# Patient Record
Sex: MALE | Race: WHITE | NOT HISPANIC OR LATINO | Employment: OTHER | URBAN - METROPOLITAN AREA
[De-identification: names, ages, dates, MRNs, and addresses within clinical notes are randomized per-mention and may not be internally consistent; named-entity substitution may affect disease eponyms.]

---

## 2017-03-16 ENCOUNTER — HOSPITAL ENCOUNTER (INPATIENT)
Facility: OTHER | Age: 57
LOS: 2 days | Discharge: HOME | DRG: 641 | End: 2017-03-19
Attending: EMERGENCY MEDICINE | Admitting: EMERGENCY MEDICINE
Payer: MEDICARE

## 2017-03-16 DIAGNOSIS — E87.6 HYPOKALEMIA: ICD-10-CM

## 2017-03-16 DIAGNOSIS — G47.33 OSA (OBSTRUCTIVE SLEEP APNEA): ICD-10-CM

## 2017-03-16 DIAGNOSIS — I25.10 CORONARY ARTERY DISEASE INVOLVING NATIVE CORONARY ARTERY OF NATIVE HEART WITHOUT ANGINA PECTORIS: ICD-10-CM

## 2017-03-16 DIAGNOSIS — R79.89 ELEVATED LFTS: ICD-10-CM

## 2017-03-16 DIAGNOSIS — I10 ESSENTIAL HYPERTENSION: ICD-10-CM

## 2017-03-16 DIAGNOSIS — F10.10 ALCOHOL ABUSE: ICD-10-CM

## 2017-03-16 DIAGNOSIS — E87.1 HYPONATREMIA: Primary | ICD-10-CM

## 2017-03-16 DIAGNOSIS — R07.89 ATYPICAL CHEST PAIN: ICD-10-CM

## 2017-03-16 DIAGNOSIS — F17.200 SMOKER: ICD-10-CM

## 2017-03-16 DIAGNOSIS — R60.0 EDEMA OF BOTH LEGS: ICD-10-CM

## 2017-03-16 DIAGNOSIS — E66.01 MORBID OBESITY, UNSPECIFIED OBESITY TYPE: ICD-10-CM

## 2017-03-16 DIAGNOSIS — Z82.49 FAMILY HISTORY OF VASCULAR DISEASE: ICD-10-CM

## 2017-03-16 DIAGNOSIS — R17 ELEVATED BILIRUBIN: ICD-10-CM

## 2017-03-16 LAB
ALBUMIN SERPL BCP-MCNC: 3.5 G/DL
ALP SERPL-CCNC: 201 U/L
ALT SERPL W/O P-5'-P-CCNC: 121 U/L
ANION GAP SERPL CALC-SCNC: 11 MMOL/L
AST SERPL-CCNC: 230 U/L
BASOPHILS # BLD AUTO: 0 K/UL
BASOPHILS NFR BLD: 0 %
BILIRUB SERPL-MCNC: 2.6 MG/DL
BUN SERPL-MCNC: 12 MG/DL
CALCIUM SERPL-MCNC: 8.2 MG/DL
CHLORIDE SERPL-SCNC: 76 MMOL/L
CO2 SERPL-SCNC: 31 MMOL/L
CREAT SERPL-MCNC: 1.2 MG/DL
CREAT UR-MCNC: 68.3 MG/DL
DIFFERENTIAL METHOD: ABNORMAL
EOSINOPHIL # BLD AUTO: 0 K/UL
EOSINOPHIL NFR BLD: 0.3 %
ERYTHROCYTE [DISTWIDTH] IN BLOOD BY AUTOMATED COUNT: 13.2 %
EST. GFR  (AFRICAN AMERICAN): >60 ML/MIN/1.73 M^2
EST. GFR  (NON AFRICAN AMERICAN): >60 ML/MIN/1.73 M^2
GLUCOSE SERPL-MCNC: 100 MG/DL
HCT VFR BLD AUTO: 36.8 %
HGB BLD-MCNC: 13.6 G/DL
LIPASE SERPL-CCNC: 39 U/L
LYMPHOCYTES # BLD AUTO: 0.9 K/UL
LYMPHOCYTES NFR BLD: 11.4 %
MAGNESIUM SERPL-MCNC: 2 MG/DL
MCH RBC QN AUTO: 32.9 PG
MCHC RBC AUTO-ENTMCNC: 37 %
MCV RBC AUTO: 89 FL
MONOCYTES # BLD AUTO: 0.7 K/UL
MONOCYTES NFR BLD: 9 %
NEUTROPHILS # BLD AUTO: 6.1 K/UL
NEUTROPHILS NFR BLD: 79 %
PLATELET # BLD AUTO: 162 K/UL
PMV BLD AUTO: 8.4 FL
POCT GLUCOSE: 82 MG/DL (ref 70–110)
POTASSIUM SERPL-SCNC: 2.8 MMOL/L
PROT SERPL-MCNC: 5.8 G/DL
RBC # BLD AUTO: 4.13 M/UL
SODIUM SERPL-SCNC: 118 MMOL/L
SODIUM UR-SCNC: <20 MMOL/L
WBC # BLD AUTO: 7.69 K/UL

## 2017-03-16 PROCEDURE — 36415 COLL VENOUS BLD VENIPUNCTURE: CPT

## 2017-03-16 PROCEDURE — 83690 ASSAY OF LIPASE: CPT

## 2017-03-16 PROCEDURE — 96365 THER/PROPH/DIAG IV INF INIT: CPT

## 2017-03-16 PROCEDURE — 80053 COMPREHEN METABOLIC PANEL: CPT

## 2017-03-16 PROCEDURE — 83930 ASSAY OF BLOOD OSMOLALITY: CPT

## 2017-03-16 PROCEDURE — 63600175 PHARM REV CODE 636 W HCPCS: Performed by: PHYSICIAN ASSISTANT

## 2017-03-16 PROCEDURE — 84300 ASSAY OF URINE SODIUM: CPT

## 2017-03-16 PROCEDURE — 82570 ASSAY OF URINE CREATININE: CPT

## 2017-03-16 PROCEDURE — 96361 HYDRATE IV INFUSION ADD-ON: CPT

## 2017-03-16 PROCEDURE — 83735 ASSAY OF MAGNESIUM: CPT

## 2017-03-16 PROCEDURE — 96366 THER/PROPH/DIAG IV INF ADDON: CPT

## 2017-03-16 PROCEDURE — 82962 GLUCOSE BLOOD TEST: CPT

## 2017-03-16 PROCEDURE — 85025 COMPLETE CBC W/AUTO DIFF WBC: CPT

## 2017-03-16 PROCEDURE — 99285 EMERGENCY DEPT VISIT HI MDM: CPT | Mod: 25

## 2017-03-16 PROCEDURE — 93005 ELECTROCARDIOGRAM TRACING: CPT

## 2017-03-16 PROCEDURE — 93010 ELECTROCARDIOGRAM REPORT: CPT | Mod: ,,, | Performed by: INTERNAL MEDICINE

## 2017-03-16 PROCEDURE — 25000003 PHARM REV CODE 250: Performed by: PHYSICIAN ASSISTANT

## 2017-03-16 RX ORDER — POTASSIUM CHLORIDE 7.45 MG/ML
10 INJECTION INTRAVENOUS
Status: COMPLETED | OUTPATIENT
Start: 2017-03-16 | End: 2017-03-17

## 2017-03-16 RX ORDER — POTASSIUM CHLORIDE 20 MEQ/1
20 TABLET, EXTENDED RELEASE ORAL
Status: DISCONTINUED | OUTPATIENT
Start: 2017-03-16 | End: 2017-03-16

## 2017-03-16 RX ORDER — POTASSIUM CHLORIDE 20 MEQ/15ML
20 SOLUTION ORAL
Status: DISCONTINUED | OUTPATIENT
Start: 2017-03-16 | End: 2017-03-16

## 2017-03-16 RX ORDER — DILTIAZEM HYDROCHLORIDE 180 MG/1
180 CAPSULE, EXTENDED RELEASE ORAL DAILY
COMMUNITY

## 2017-03-16 RX ORDER — CHLORDIAZEPOXIDE HYDROCHLORIDE 25 MG/1
50 CAPSULE, GELATIN COATED ORAL 4 TIMES DAILY
Status: DISPENSED | OUTPATIENT
Start: 2017-03-17 | End: 2017-03-17

## 2017-03-16 RX ORDER — POTASSIUM CHLORIDE 20 MEQ/15ML
40 SOLUTION ORAL
Status: COMPLETED | OUTPATIENT
Start: 2017-03-16 | End: 2017-03-16

## 2017-03-16 RX ADMIN — SODIUM CHLORIDE 1000 ML: 0.9 INJECTION, SOLUTION INTRAVENOUS at 10:03

## 2017-03-16 RX ADMIN — POTASSIUM CHLORIDE 40 MEQ: 40 SOLUTION ORAL at 11:03

## 2017-03-16 RX ADMIN — POTASSIUM CHLORIDE 10 MEQ: 10 INJECTION, SOLUTION INTRAVENOUS at 11:03

## 2017-03-16 NOTE — IP AVS SNAPSHOT
[facility] Location ([facility])  [address] [place], [place] [id number]  Phone: [phone]           Patient Discharge Instructions     Our goal is to set you up for success. This packet includes information on your condition, medications, and your home care. It will help you to care for yourself so you don't get sicker and need to go back to the hospital.     Please ask your nurse if you have any questions.        There are many details to remember when preparing to leave the hospital. Here is what you will need to do:    1. Take your medicine. If you are prescribed medications, review your Medication List in the following pages. You may have new medications to  at the pharmacy and others that you'll need to stop taking. Review the instructions for how and when to take your medications. Talk with your doctor or nurses if you are unsure of what to do.     2. Go to your follow-up appointments. Specific follow-up information is listed in the following pages. Your may be contacted by a transition nurse or clinical provider about future appointments. Be sure we have all of the phone numbers to reach you, if needed. Please contact your provider's office if you are unable to make an appointment.     3. Watch for warning signs. Your doctor or nurse will give you detailed warning signs to watch for and when to call for assistance. These instructions may also include educational information about your condition. If you experience any of warning signs to your health, call your doctor.               [facility]  Unless otherwise directed by your provider, please contact [facility], our nurse care line that is available for 24/7 assistance.     [phone] (toll-free)    Registered nurses in the [facility] provide clinical advisement, health education, appointment booking, and other advisory services.                    ** Verify the list of medication(s) below is accurate and up to  date. Carry this with you in case of emergency. If your medications have changed, please notify your healthcare provider.             Medication List      CONTINUE taking these medications        Additional Info                      ABILIFY 2 MG Tab   Refills:  2   Dose:  2 mg   Generic drug:  aripiprazole    Last time this was given:  2 mg on 3/19/2017 10:59 AM   Instructions:  Take 2 mg by mouth once daily.     Begin Date    AM    Noon    PM    Bedtime       atorvastatin 20 MG tablet   Commonly known as:  LIPITOR   Quantity:  90 tablet   Refills:  2   Comments:  Refill [id number]    Last time this was given:  20 mg on 3/19/2017 10:35 AM   Instructions:  Take 1 tablet by mouth every day     Begin Date    AM    Noon    PM    Bedtime       buPROPion 300 MG 24 hr tablet   Commonly known as:  WELLBUTRIN XL   Refills:  0   Dose:  300 mg    Last time this was given:  300 mg on 3/18/2017  8:50 AM   Instructions:  Take 300 mg by mouth once daily.     Begin Date    AM    Noon    PM    Bedtime       clopidogrel 75 mg tablet   Commonly known as:  PLAVIX   Quantity:  90 tablet   Refills:  3   Comments:  Refill [id number]    Last time this was given:  75 mg on 3/19/2017 10:35 AM   Instructions:  Take 1 tablet by mouth every day     Begin Date    AM    Noon    PM    Bedtime       diltiaZEM 180 MG Cpsr   Commonly known as:  TIAZAC   Refills:  0   Dose:  180 mg    Instructions:  Take 180 mg by mouth once daily.     Begin Date    AM    Noon    PM    Bedtime       ferrous sulfate 325 mg (65 mg iron) Tab tablet   Refills:  0   Dose:  325 mg    Instructions:  Take 325 mg by mouth daily with breakfast.     Begin Date    AM    Noon    PM    Bedtime       losartan 100 MG tablet   Commonly known as:  COZAAR   Quantity:  90 tablet   Refills:  2   Comments:  Refill [id number]    Last time this was given:  100 mg on 3/19/2017 10:35 AM   Instructions:  Take 1 tablet by mouth every day     Begin Date    AM    Noon    PM    Bedtime       ZOLOFT 100 MG  tablet   Refills:  0   Dose:  100 mg   Generic drug:  sertraline    Last time this was given:  200 mg on 3/19/2017  6:20 AM   Instructions:  Take 100 mg by mouth every morning.     Begin Date    AM    Noon    PM    Bedtime         STOP taking these medications     hydrochlorothiazide 25 MG tablet   Commonly known as:  HYDRODIURIL                  Please bring to all follow up appointments:    1. A copy of your discharge instructions.  2. All medicines you are currently taking in their original bottles.  3. Identification and insurance card.    Please arrive 15 minutes ahead of scheduled appointment time.    Please call 24 hours in advance if you must reschedule your appointment and/or time.        Your Scheduled Appointments     Apr 25, 2017  3:20 PM CDT   Established Patient Visit with Ming Sage MD   [facility]    [address] [place] [address]  [address]  [facility] [address] [phone]              Follow-up Information     Follow up with Ming Sage MD In 1 week.    Specialty:  Cardiology    Contact information:    [address] [place] [address] [phone]          Discharge Instructions     Future Orders    Activity as tolerated     Call MD for:  difficulty breathing or increased cough     Call MD for:  persistent nausea and vomiting or diarrhea     Call MD for:  redness, tenderness, or signs of infection (pain, swelling, redness, odor or green/yellow discharge around incision site)     Call MD for:  severe uncontrolled pain     Call MD for:  temperature >100.4     Diet general     Questions:    Total calories:      Fat restriction, if any:      Protein restriction, if any:      Na restriction, if any:      Fluid restriction:      Additional restrictions:          Discharge Instructions       Your feedback is important to us.  If you should receive a survey in the next few days, please share your experience with us.      Discharge  "References/Attachments     HYPONATREMIA (ENGLISH)    HYPONATREMIA, DISCHARGE INSTRUCTIONS (ENGLISH)        Primary Diagnosis     Your primary diagnosis was:  Low Sodium Levels      Admission Information     Date & Time Provider Department CSN    3/16/2017  9:09 PM Ming Sage MD [facility] [id number]      Care Providers     Provider Role Specialty Primary office phone    Ming Sage MD Attending Provider Cardiology [phone]      Your Vitals Were     BP Pulse Temp Resp Height Weight    109/65 (BP Location: Right arm, Patient Position: Sitting, BP Method: Automatic) 62 97.7 °F (36.5 °C) (Axillary) 18 5' 6" (1.676 m) 116.6 kg (257 lb)    SpO2 BMI             100% 41.48 kg/m2         Recent Lab Values     No lab values to display.      Allergies as of 3/19/2017     No Known Allergies      Advance Directives     An advance directive is a document which, in the event you are no longer able to make decisions for yourself, tells your healthcare team what kind of treatment you do or do not want to receive, or who you would like to make those decisions for you.  If you do not currently have an advance directive, [facility] encourages you to create one.  For more information call:  [phone]), [phone] ([phone]),  or log on to [email or web address].        Smoking Cessation     If you would like to quit smoking:   You may be eligible for free services if you are a [place] resident and started smoking cigarettes before September 1, 1988.  Call the Smoking Cessation Trust (SCT) toll free at (613) 601-4500 or (843) 442-7442.   Call 3-188-QUIT-NOW if you do not meet the above criteria.            Language Assistance Services     ATTENTION: Language assistance services are available, free of charge. Please call [phone].      ATENCIÓN: Si habla español, tiene a cummings disposición servicios gratuitos de asistencia lingüística. Llame al [phone].     CHÚ Ý: " N?u b?n nói Ti?ng Vi?t, có các d?ch v? h? tr? ngôn ng? mi?n phí dành cho b?n. G?i s? [phone].        [facility] Sign-Up     Activating your [facility] account is as easy as 1-2-3!     1) Visit [email or web address], select Sign Up Now, enter this activation code and your date of birth, then select Next.  [email or web address]  Expires: 5/3/2017 11:35 AM      2) Create a username and password to use when you visit [facility] in the future and select a security question in case you lose your password and select Next.    3) Enter your e-mail address and click Sign Up!    Additional Information  If you have questions, please e-mail [email or web address] or call [phone] to talk to our [facility] staff. Remember, [facility] is NOT to be used for urgent needs. For medical emergencies, dial 911.          [facility] complies with applicable Federal civil rights laws and does not discriminate on the basis of race, color, national origin, age, disability, or sex.

## 2017-03-17 PROBLEM — E87.1 HYPONATREMIA: Status: ACTIVE | Noted: 2017-03-17

## 2017-03-17 LAB
ANION GAP SERPL CALC-SCNC: 7 MMOL/L
BUN SERPL-MCNC: 9 MG/DL
CALCIUM SERPL-MCNC: 7.7 MG/DL
CHLORIDE SERPL-SCNC: 85 MMOL/L
CO2 SERPL-SCNC: 30 MMOL/L
CREAT SERPL-MCNC: 0.9 MG/DL
EST. GFR  (AFRICAN AMERICAN): >60 ML/MIN/1.73 M^2
EST. GFR  (NON AFRICAN AMERICAN): >60 ML/MIN/1.73 M^2
GLUCOSE SERPL-MCNC: 104 MG/DL
OSMOLALITY SERPL: 236 MOSM/KG
POTASSIUM SERPL-SCNC: 2.8 MMOL/L
SODIUM SERPL-SCNC: 122 MMOL/L

## 2017-03-17 PROCEDURE — 80048 BASIC METABOLIC PNL TOTAL CA: CPT

## 2017-03-17 PROCEDURE — 11000001 HC ACUTE MED/SURG PRIVATE ROOM

## 2017-03-17 PROCEDURE — 25000003 PHARM REV CODE 250: Performed by: PHYSICIAN ASSISTANT

## 2017-03-17 PROCEDURE — 25000003 PHARM REV CODE 250: Performed by: INTERNAL MEDICINE

## 2017-03-17 RX ORDER — POTASSIUM CHLORIDE 20 MEQ/15ML
40 SOLUTION ORAL ONCE
Status: COMPLETED | OUTPATIENT
Start: 2017-03-17 | End: 2017-03-17

## 2017-03-17 RX ORDER — SERTRALINE HYDROCHLORIDE 50 MG/1
200 TABLET, FILM COATED ORAL EVERY MORNING
Status: DISCONTINUED | OUTPATIENT
Start: 2017-03-17 | End: 2017-03-19 | Stop reason: HOSPADM

## 2017-03-17 RX ORDER — DILTIAZEM HYDROCHLORIDE 180 MG/1
180 CAPSULE, COATED, EXTENDED RELEASE ORAL DAILY
Status: DISCONTINUED | OUTPATIENT
Start: 2017-03-17 | End: 2017-03-19 | Stop reason: HOSPADM

## 2017-03-17 RX ORDER — SODIUM CHLORIDE AND POTASSIUM CHLORIDE 150; 900 MG/100ML; MG/100ML
INJECTION, SOLUTION INTRAVENOUS CONTINUOUS
Status: DISCONTINUED | OUTPATIENT
Start: 2017-03-17 | End: 2017-03-18

## 2017-03-17 RX ORDER — ACETAMINOPHEN 325 MG/1
650 TABLET ORAL EVERY 4 HOURS PRN
Status: DISCONTINUED | OUTPATIENT
Start: 2017-03-17 | End: 2017-03-19 | Stop reason: HOSPADM

## 2017-03-17 RX ORDER — ARIPIPRAZOLE 2 MG/1
2 TABLET ORAL DAILY
Status: DISCONTINUED | OUTPATIENT
Start: 2017-03-17 | End: 2017-03-19 | Stop reason: HOSPADM

## 2017-03-17 RX ORDER — CLOPIDOGREL BISULFATE 75 MG/1
75 TABLET ORAL DAILY
Status: DISCONTINUED | OUTPATIENT
Start: 2017-03-17 | End: 2017-03-19 | Stop reason: HOSPADM

## 2017-03-17 RX ORDER — BUPROPION HYDROCHLORIDE 150 MG/1
300 TABLET ORAL DAILY
Status: DISCONTINUED | OUTPATIENT
Start: 2017-03-17 | End: 2017-03-18

## 2017-03-17 RX ORDER — LOSARTAN POTASSIUM 50 MG/1
100 TABLET ORAL DAILY
Status: DISCONTINUED | OUTPATIENT
Start: 2017-03-17 | End: 2017-03-19 | Stop reason: HOSPADM

## 2017-03-17 RX ORDER — ONDANSETRON 2 MG/ML
4 INJECTION INTRAMUSCULAR; INTRAVENOUS EVERY 8 HOURS PRN
Status: DISCONTINUED | OUTPATIENT
Start: 2017-03-17 | End: 2017-03-19 | Stop reason: HOSPADM

## 2017-03-17 RX ORDER — ATORVASTATIN CALCIUM 20 MG/1
20 TABLET, FILM COATED ORAL DAILY
Status: DISCONTINUED | OUTPATIENT
Start: 2017-03-17 | End: 2017-03-19 | Stop reason: HOSPADM

## 2017-03-17 RX ADMIN — LOSARTAN POTASSIUM 100 MG: 50 TABLET, FILM COATED ORAL at 08:03

## 2017-03-17 RX ADMIN — ATORVASTATIN CALCIUM 20 MG: 20 TABLET, FILM COATED ORAL at 08:03

## 2017-03-17 RX ADMIN — BUPROPION HYDROCHLORIDE 300 MG: 150 TABLET, FILM COATED, EXTENDED RELEASE ORAL at 08:03

## 2017-03-17 RX ADMIN — POTASSIUM CHLORIDE 40 MEQ: 40 SOLUTION ORAL at 05:03

## 2017-03-17 RX ADMIN — CHLORDIAZEPOXIDE HYDROCHLORIDE 50 MG: 25 CAPSULE ORAL at 05:03

## 2017-03-17 RX ADMIN — CHLORDIAZEPOXIDE HYDROCHLORIDE 50 MG: 25 CAPSULE ORAL at 12:03

## 2017-03-17 RX ADMIN — SODIUM CHLORIDE AND POTASSIUM CHLORIDE: 9; 1.49 INJECTION, SOLUTION INTRAVENOUS at 05:03

## 2017-03-17 RX ADMIN — SODIUM CHLORIDE AND POTASSIUM CHLORIDE: 9; 1.49 INJECTION, SOLUTION INTRAVENOUS at 02:03

## 2017-03-17 RX ADMIN — CHLORDIAZEPOXIDE HYDROCHLORIDE 50 MG: 25 CAPSULE ORAL at 06:03

## 2017-03-17 RX ADMIN — CLOPIDOGREL BISULFATE 75 MG: 75 TABLET, FILM COATED ORAL at 08:03

## 2017-03-17 RX ADMIN — ARIPIPRAZOLE 2 MG: 2 TABLET ORAL at 08:03

## 2017-03-17 RX ADMIN — ACETAMINOPHEN 650 MG: 325 TABLET ORAL at 09:03

## 2017-03-17 RX ADMIN — SERTRALINE HYDROCHLORIDE 200 MG: 100 TABLET ORAL at 07:03

## 2017-03-17 NOTE — ED NOTES
Rounding on patient completed. Pt requesting juice.The call bell remains at the bedside for any additional patient needs. The patient is resting comfortably on the stretcher, respirations are even and unlabored, skin warm and dry. Will continue to monitor.

## 2017-03-17 NOTE — ED NOTES
Rounding on the patient has been done. he has been updated on the plan of care and his current status. Pain was assessed and is currently a 0/10. Comfort positioning and restroom needs were addressed. Necessary items were placed with in his reach and he was advised when a reassessment would take place. The call bell remains at the bedside for any additional patient needs. The patient is resting comfortably on the stretcher, respirations are even and unlabored, skin warm and dry. Will continue to monitor. Eating  breakfast.

## 2017-03-17 NOTE — ED NOTES
Rounding on the patient has been done. he has been updated on the plan of care and his current status. Pain was assessed and is currently a 0/10. Comfort positioning and restroom needs were addressed. Necessary items were placed with in his reach and he was advised when a reassessment would take place. The call bell remains at the bedside for any additional patient needs. The patient is resting comfortably on the stretcher, respirations are even and unlabored, skin warm and dry. Will continue to monitor. Pt informed diet order.

## 2017-03-17 NOTE — ED NOTES
Rounding on the patient has been done.The call bell remains at the bedside for any additional patient needs. The patient is resting comfortably on the stretcher, respirations are even and unlabored, skin warm and dry. Will continue to monitor.

## 2017-03-17 NOTE — ED NOTES
Report received from RADHA Esteban. Rounding on the patient has been done. he has been updated on the plan of care and his current status. The call bell remains at the bedside for any additional patient needs. The patient is resting comfortably on the stretcher, respirations are even and unlabored, skin warm and dry. Will continue to monitor.

## 2017-03-17 NOTE — ED NOTES
Rounding on the patient has been done. The call bell remains at the bedside for any additional patient needs. The patient is resting comfortably on the stretcher, respirations are even and unlabored, skin warm and dry. Will continue to monitor.

## 2017-03-17 NOTE — ED NOTES
Pt presents to the ED with c/o not feeling well for about a week, pt also reports he is an alcoholic. Pt reports he had low BP readings of home with readings of 88/41, 91/42. Pt reports he has had low appetite and not eating much over the past few days. Pt reports that he drinks all night and has been having episodes of N/V that have been getting worse over the past month. Denies diarrhea, fever. Pt reports he drinks 6 beers and 2 pints of scotch a night.

## 2017-03-17 NOTE — ED PROVIDER NOTES
Encounter Date: 3/16/2017       History     Chief Complaint   Patient presents with    Fatigue     after taking BP medication today. reports nausea, and decreased appetite all week.      Review of patient's allergies indicates:  No Known Allergies  HPI Comments: Patient is a 56 y.o. male with a past medical history of CAD, HTN, hypercholesterolemia, alcohol abuse, presenting to the emergency department with complaints of fatigue.  The patient reports that for the past week he has been feeling weak and increasingly fatigued.  He reports he's had decreased appetite and early satiety.  He states that earlier today while at his sister's house he became lightheaded and faint.  He states that his sister took his blood pressure with a wrist blood pressure cuff and noted it to be 88/41, and repeated a second time where was 91/42.  The patient reports he laid down, ate some chicken broth and felt some relief.  He denies chest pain or shortness of breath, but reports he is concerned with the amount of weakness he has been experiencing.  He reports he has been able tolerate fluids, but has not been eating well.  He does admit that he drinks 6 beers and 2 pints of scotch every night, and has done so for at least 6 months. He states he would like to quit drinking and is discussing this with his PCP.     The history is provided by the patient and the spouse.     Past Medical History:   Diagnosis Date    Coronary artery disease     Hypercholesteremia     Hypertension     Sleep apnea     Does not use CPAP     Past Surgical History:   Procedure Laterality Date    CHOLECYSTECTOMY      2005    CORONARY STENT PLACEMENT  2014    1 stent placed    GASTRIC BYPASS  2003     No family history on file.  Social History   Substance Use Topics    Smoking status: Current Every Day Smoker     Packs/day: 1.00     Years: 6.00     Types: Cigarettes     Start date: 7/20/2013    Smokeless tobacco: Not on file      Comment: 1 PPD    Alcohol  use Yes      Comment: Socially     Review of Systems   Constitutional: Positive for fatigue. Negative for activity change, chills and fever.   HENT: Negative for congestion, rhinorrhea, sinus pressure, sneezing, sore throat and trouble swallowing.    Eyes: Negative for photophobia and visual disturbance.   Respiratory: Negative for cough, shortness of breath and wheezing.    Cardiovascular: Negative for chest pain and palpitations.   Gastrointestinal: Positive for nausea. Negative for abdominal pain, constipation, diarrhea and vomiting.   Genitourinary: Negative for dysuria, hematuria and urgency.   Musculoskeletal: Negative for back pain, myalgias and neck pain.   Skin: Negative for color change and wound.   Neurological: Positive for weakness and light-headedness. Negative for dizziness, numbness and headaches.   Psychiatric/Behavioral: Negative for agitation and confusion.       Physical Exam   Initial Vitals   BP Pulse Resp Temp SpO2   03/16/17 1851 03/16/17 1851 03/16/17 1851 03/16/17 1851 03/16/17 1851   113/70 74 18 98.2 °F (36.8 °C) 97 %     Physical Exam    Nursing note and vitals reviewed.  Constitutional: Vital signs are normal. He appears well-developed and well-nourished. He is not diaphoretic.  Non-toxic appearance. He does not have a sickly appearance. No distress.   Well appearing,  male accompanied by his wife in the emergency department.  He is speaking in clear and full sentences, moving all extremities.  He is in no acute distress.   HENT:   Head: Normocephalic and atraumatic.   Right Ear: Hearing, tympanic membrane, external ear and ear canal normal.   Left Ear: Hearing, tympanic membrane, external ear and ear canal normal.   Nose: Nose normal.   Mouth/Throat: Oropharynx is clear and moist.   Eyes: Conjunctivae and EOM are normal.   Neck: Normal range of motion. Neck supple.   Cardiovascular: Normal rate, regular rhythm and normal heart sounds.   Pulmonary/Chest: Breath sounds normal.  No respiratory distress. He has no wheezes. He has no rhonchi. He has no rales.   Abdominal: Soft. Bowel sounds are normal. He exhibits no distension. There is tenderness. There is no rebound and no guarding.   Obese abdomen with tenderness to palpation of the right upper quadrant, no rebound, guarding.   Musculoskeletal: Normal range of motion.   Neurological: He is alert and oriented to person, place, and time. He has normal strength. No cranial nerve deficit or sensory deficit. GCS eye subscore is 4. GCS verbal subscore is 5. GCS motor subscore is 6.   Skin: Skin is warm.   Psychiatric: He has a normal mood and affect. His behavior is normal. Judgment and thought content normal.         ED Course   Procedures  Labs Reviewed   CBC W/ AUTO DIFFERENTIAL - Abnormal; Notable for the following:        Result Value    RBC 4.13 (*)     Hemoglobin 13.6 (*)     Hematocrit 36.8 (*)     MCH 32.9 (*)     MCHC 37.0 (*)     MPV 8.4 (*)     Lymph # 0.9 (*)     Gran% 79.0 (*)     Lymph% 11.4 (*)     All other components within normal limits   COMPREHENSIVE METABOLIC PANEL - Abnormal; Notable for the following:     Sodium 118 (*)     Potassium 2.8 (*)     Chloride 76 (*)     CO2 31 (*)     Calcium 8.2 (*)     Total Protein 5.8 (*)     Total Bilirubin 2.6 (*)     Alkaline Phosphatase 201 (*)      (*)      (*)     All other components within normal limits    Narrative:     Na critical result(s) called and verbal readback obtained from Eulalia Eldridge [id number], 03/16/2017 22:40   LIPASE   SODIUM, URINE, RANDOM   CREATININE, URINE, RANDOM   MAGNESIUM   MAGNESIUM   OSMOLALITY   OSMOLALITY   POCT GLUCOSE   POCT GLUCOSE MONITORING CONTINUOUS     EKG Readings: (Independently Interpreted)   Initial Reading: No STEMI. Previous EKG: Compared with most recent EKG Previous EKG Date: 9/19/16. Rhythm: Normal Sinus Rhythm. Heart Rate: 60.   Prolonged QT           Medical Decision Making:   History:   Old Medical Records: I  decided to obtain old medical records.  Old Records Summarized: other records.       <> Summary of Records: Reviewed medical records in epic  Independently Interpreted Test(s):   I have ordered and independently interpreted EKG Reading(s) - see prior notes  Clinical Tests:   Lab Tests: Ordered and Reviewed  The following lab test(s) were unremarkable: CBC, CMP and Lipase       <> Summary of Lab: Accu-Chek, magnesium, osmolality, urine sodium, urine creatinine  Radiological Study: Ordered and Reviewed  Medical Tests: Ordered and Reviewed  Other:   I have discussed this case with another health care provider.       <> Summary of the Discussion: Dr. Longo, Dr. Sage  This note was created using Dragon Medical Dictation. There may be typographical errors secondary to dictation.     Urgent evaluation of a 56 y.o. male with a past medical history of CAD, HTN, hypercholesterolemia, alcohol abuse, presenting to the emergency department complaining of fatigue. Patient is afebrile, nontoxic appearing and hemodynamically stable.  Physical exam reveals regular rate and rhythm, lungs are clear to auscultation bilaterally.  Mild tenderness to palpation of the abdomen in the right upper quadrant with no rebound, guarding, mass.  Will plan to obtain basic blood work, EKG, administer IV fluids and reassess.  EKG shows normal sinus rhythm with a rate of 60 bpm, no STEMI.  Prolonged QT is noted.  CBC shows no leukocytosis, H&H is 13.6/36.8.  CMP shows hyponatremia with a sodium of 118, hypokalemia with potassium of 2.8, chloride is 76, CO2 is 31, total bili is 2.6.  LFTs are all elevated, alkaline phosphatase is 201, AST is 230, AST is 121.  Lipase is normal.  Magnesium is normal.  Given these results, the patient does need to be admitted for electrolytes and further assessment. Patient sees Dr. Sage, will discuss possible admission with him.   11:19 PM Discussed with Dr. Sage, will admit the patient to him for further  management.   RUQ ultrasound ordered, potassium ordered, will continue IV hydration. The treatment plan was discussed with the patient who demonstrated understanding and comfort with plan. The patient's history, physical exam, and plan of care was discussed with and agreed upon with my supervising physician.     Past Medical History:   Diagnosis Date    Coronary artery disease     Hypercholesteremia     Hypertension     Sleep apnea     Does not use CPAP                     ED Course     Clinical Impression:     1. Hyponatremia    2. Hypokalemia    3. Elevated bilirubin    4. Elevated LFTs    5. Alcohol abuse         Disposition:   Disposition: Admitted  Condition: Stable       Padmini Gomez PA-C  03/17/17 0014

## 2017-03-18 PROBLEM — E87.6 HYPOKALEMIA: Status: ACTIVE | Noted: 2017-03-18

## 2017-03-18 LAB
ALBUMIN SERPL BCP-MCNC: 2.9 G/DL
ALP SERPL-CCNC: 143 U/L
ALT SERPL W/O P-5'-P-CCNC: 83 U/L
ANION GAP SERPL CALC-SCNC: 6 MMOL/L
AST SERPL-CCNC: 119 U/L
BILIRUB SERPL-MCNC: 1.4 MG/DL
BUN SERPL-MCNC: 6 MG/DL
CALCIUM SERPL-MCNC: 7.7 MG/DL
CHLORIDE SERPL-SCNC: 92 MMOL/L
CO2 SERPL-SCNC: 25 MMOL/L
CREAT SERPL-MCNC: 0.8 MG/DL
EST. GFR  (AFRICAN AMERICAN): >60 ML/MIN/1.73 M^2
EST. GFR  (NON AFRICAN AMERICAN): >60 ML/MIN/1.73 M^2
GLUCOSE SERPL-MCNC: 91 MG/DL
POTASSIUM SERPL-SCNC: 3.1 MMOL/L
PROT SERPL-MCNC: 5.1 G/DL
SODIUM SERPL-SCNC: 123 MMOL/L

## 2017-03-18 PROCEDURE — 80053 COMPREHEN METABOLIC PANEL: CPT

## 2017-03-18 PROCEDURE — 11000001 HC ACUTE MED/SURG PRIVATE ROOM

## 2017-03-18 PROCEDURE — 36415 COLL VENOUS BLD VENIPUNCTURE: CPT

## 2017-03-18 PROCEDURE — 25000003 PHARM REV CODE 250: Performed by: PHYSICIAN ASSISTANT

## 2017-03-18 PROCEDURE — 25000003 PHARM REV CODE 250: Performed by: INTERNAL MEDICINE

## 2017-03-18 RX ORDER — POTASSIUM CHLORIDE 20 MEQ/15ML
40 SOLUTION ORAL ONCE
Status: COMPLETED | OUTPATIENT
Start: 2017-03-18 | End: 2017-03-18

## 2017-03-18 RX ORDER — CHLORDIAZEPOXIDE HYDROCHLORIDE 10 MG/1
10 CAPSULE, GELATIN COATED ORAL 3 TIMES DAILY
Status: DISCONTINUED | OUTPATIENT
Start: 2017-03-18 | End: 2017-03-19 | Stop reason: HOSPADM

## 2017-03-18 RX ADMIN — SODIUM CHLORIDE AND POTASSIUM CHLORIDE: 9; 1.49 INJECTION, SOLUTION INTRAVENOUS at 10:03

## 2017-03-18 RX ADMIN — POTASSIUM CHLORIDE 40 MEQ: 40 SOLUTION ORAL at 12:03

## 2017-03-18 RX ADMIN — SERTRALINE HYDROCHLORIDE 200 MG: 100 TABLET ORAL at 05:03

## 2017-03-18 RX ADMIN — CHLORDIAZEPOXIDE HYDROCHLORIDE 10 MG: 10 CAPSULE ORAL at 12:03

## 2017-03-18 RX ADMIN — ATORVASTATIN CALCIUM 20 MG: 20 TABLET, FILM COATED ORAL at 08:03

## 2017-03-18 RX ADMIN — BUPROPION HYDROCHLORIDE 300 MG: 150 TABLET, FILM COATED, EXTENDED RELEASE ORAL at 08:03

## 2017-03-18 RX ADMIN — CLOPIDOGREL BISULFATE 75 MG: 75 TABLET, FILM COATED ORAL at 08:03

## 2017-03-18 RX ADMIN — ARIPIPRAZOLE 2 MG: 2 TABLET ORAL at 08:03

## 2017-03-18 RX ADMIN — DILTIAZEM HYDROCHLORIDE 180 MG: 180 CAPSULE, COATED, EXTENDED RELEASE ORAL at 08:03

## 2017-03-18 RX ADMIN — LOSARTAN POTASSIUM 100 MG: 50 TABLET, FILM COATED ORAL at 08:03

## 2017-03-18 NOTE — H&P
HISTORY OF PRESENT ILLNESS:  The patient is a 56-year-old gentleman that has   complained of profound weakness, nausea, lack of appetite, lightheadedness and   feeling faint for the last 3 or 4 days.  He says that his sister took his blood   pressure and noted to be low, he had some chicken broth and felt a little   better.  He decided to be checked out here for further evaluation because of his   concerned that he has become an alcoholic over the last year.  He says he now   drinks all night long, between 6 beers and 2 pints of scotch each night.  He   says he has quit smoking as of February of this year.  He has been afraid to   quit drinking on soon for fear of having withdrawals.    PAST MEDICAL HISTORY:  He had a longstanding history of hypertension, 20 years   ago, he used to use CPAP for sleep apnea; however, got a new CPAP machine about   two weeks ago, which he says he has not yet gotten accustomed to.  He has a   coronary artery disease, having had an intracoronary stent placed in his right   coronary artery, last year.  He used to be a heavy cigarette smoker; however,   has not smoked since the last month.  He has had previous gastric bypass surgery   in the year 2003.  He has a cholecystectomy in 2005.  He does not use any   illicit drugs.    FAMILY HISTORY:  His father has hypertension and degenerative joint disease of   his back.  His father is in his 80s.  His mother is a diabetic, has coronary   artery disease and is presently on dialysis, has end-stage renal disease.  She   is in her 80s.    REVIEW OF SYSTEMS:  CONSTITUTIONAL:  He has had a fair amount of weight gain over the last few   years.  He has had no fever or chills.  HEAD, EAR, NOSE AND THROAT:  He has no headaches.  He wears glasses.  No visual   change.  No nasal congestion.  No sore throat.  RESPIRATORY:  He has an occasional cough, no shortness of breath, no wheezing.  CARDIAC:  No chest pain, no palpitation, no shortness of breath.  No  pedal   edema.  GASTROINTESTINAL:  He has been nauseous, has no abdominal pain, constipation,   diarrhea or bleeding per rectum.  GENITOURINARY:  No hematuria, dysuria or pyuria.  SKIN:  No rash and no bruising.    PHYSICAL EXAMINATION:  GENERAL:  Reveals an alert, pleasant man in no acute distress.  VITAL SIGNS:  Blood pressure of 113/70 and a pulse of 74 beats per minute and   respiration of 80s per minute.  He is afebrile.  EYES:  The sclerae are nonicteric.  The conjunctivae are pink.  ENT:  Unremarkable.  NECK:  Supple.  There is no jugular venous distention.  The carotid upstroke is   brisk.  There is no carotid bruit.  CHEST:  Clear.  HEART:  Size is grossly normal.  S1 and S2 are normal.  There is no audible   murmur or gallop.  ABDOMEN:  Soft and nontender.  Bowel sounds are normal.  EXTREMITIES:  There is hyperpigmentation of both lower legs.  There is no pedal   edema.  NEUROLOGIC:  Grossly, the neurological exam is intact.    IMPRESSION ON ADMISSION:  1.  Profound hyponatremia, possibly from the daily use of hydrochlorothiazide.  2.  Hypokalemia, possibly due to hydrochlorothiazide.  3.  Obstructive sleep apnea.  4.  Morbid obesity.  5.  Stable coronary artery disease.  6.  Hypertension.  7.  Alcoholism.  8.  Ex-cigarette smoker.      NEERU/  dd: 03/17/2017 15:22:20 (CDT)  td: 03/17/2017 21:09:30 (CDT)  Doc ID   #[id number]  Job ID #[id number]    CC:

## 2017-03-18 NOTE — PROGRESS NOTES
Cardiology  Progress Notes    Subjective: Feels better. Can now eat!      Review of Systems:  Constitutional: no fever or chills  Eyes: no visual changes  ENT: no nasal congestion or sore throat  Respiratory: no cough or shortness of breath  Cardiovascular: no chest pain or palpitations  Gastrointestinal:  no nausea or vomiting  Genitourinary:  no hematuria or dysuria  Integument/Breast: no rash or pruritis  Hematologic/Lymphatic: no easy bruising or lymphadenopathy  Musculoskeletal:  no arthralgias or myalgias or deformities  Neurological: no seizures or tremors  Behavioral/Psych: no auditory or visual hallucinations  Endocrine: no heat or cold intolerance    Vital Signs:  Vitals:    03/18/17 0700 03/18/17 0746 03/18/17 0800 03/18/17 1000   BP:  99/61     BP Location:  Left arm     Patient Position:  Sitting     BP Method:  Automatic     Pulse: 68 61 83 66   Resp:  18     Temp:  97.8 °F (36.6 °C)     TempSrc:  Oral     SpO2:  99%     Weight:       Height:           Physical Exam:  General: no distress  Neck: no jugular venous distention. Carotid upstroke brisk. No carotid bruit. No thyromegaly, no lymphadenopathy  Lungs:  clear to auscultation bilaterally, normal respiratory effort and normal percussion bilaterally  Chest Wall: no tenderness, clear  Heart: regular rate and rhythm and no murmur, S1,S2 normal, no gallop  Abdomen: soft, non-tender non-distended; bowel sounds normal  Extremities: warm, no cyanosis or edema, or clubbing  Neurologic: alert, oriented, thought content appropriate    Problem List:  Active Hospital Problems    Diagnosis  POA    *Hyponatremia [E87.1]  Yes      Resolved Hospital Problems    Diagnosis Date Resolved POA   No resolved problems to display.         Laboratory:  CBC:   Recent Labs  Lab 03/16/17 2201   WBC 7.69   RBC 4.13*   HGB 13.6*   HCT 36.8*      MCV 89   MCH 32.9*   MCHC 37.0*     BMP:   Recent Labs  Lab 03/16/17 2201 03/18/17  0420     < > 91   *  < >  123*   K 2.8*  < > 3.1*   CL 76*  < > 92*   CO2 31*  < > 25   BUN 12  < > 6   CREATININE 1.2  < > 0.8   CALCIUM 8.2*  < > 7.7*   MG 2.0  --   --    < > = values in this interval not displayed.  CMP:   Recent Labs  Lab 03/18/17  0420   GLU 91   CALCIUM 7.7*   ALBUMIN 2.9*   PROT 5.1*   *   K 3.1*   CO2 25   CL 92*   BUN 6   CREATININE 0.8   ALKPHOS 143*   ALT 83*   *   BILITOT 1.4*     LFTs:   Recent Labs  Lab 03/18/17  0420   ALT 83*   *   ALKPHOS 143*   BILITOT 1.4*   PROT 5.1*   ALBUMIN 2.9*     Coagulation: No results for input(s): INR, APTT in the last 168 hours.    Invalid input(s): PT  Cardiac markers: No results for input(s): CKMB, CPKMB, TROPONINT, TROPONINI, MYOGLOBIN in the last 168 hours.    Imaging Results         X-Ray Chest PA And Lateral (Final result) Result time:  03/17/17 10:25:08    Final result by Tuyet Steward MD (03/17/17 10:25:08)    Impression:      Clear lungs       Electronically signed by: TUYET STEWARD  Date:     03/17/17  Time:    10:25     Narrative:    HISTORY:  smoker    TECHNIQUE: PA and lateral chest radiograph     COMPARISON: N/A      FINDINGS:  Support devices: None    Chest: Cardiac and mediastinal silhouettes are normal.  Lungs are well aerated and clear.  No pleural effusion or pneumothorax.    Upper Abdomen: Upper abdominal surgical clips.    Other: Remote left 6th rib fracture.            US Abdomen Limited (Final result) Result time:  03/17/17 00:41:29    Final result by El Pavon MD (03/17/17 00:41:29)    Impression:       Status post cholecystectomy with enlarged CBD.  MRCP or ERCP may be obtained for further evaluation.    Hepatic steatosis.    9 mm cyst in the upper pole of the right kidney.  No evidence of nephrolithiasis.              Electronically signed by: EL PAVON MD  Date:     03/17/17  Time:    00:41     Narrative:    Exam: [id number]  03/16/17  23:39:47 [id number] ([facility]) : US ABDOMEN LIMITED    Technique:    Limited transabdominal right upper  quadrant ultrasound was performed.    Comparison:     None     Findings:      The pancreas is not visualized secondary to extensive bowel gas.  The abdominal aorta and the IVC are  also not visualized secondary to overlying bowel gas.    The patient is status post cholecystectomy.  No abnormalities are identified in the gallbladder fossa.    The CBD measures 11.6 mm.  There is no evidence of intrahepatic biliary dilatation.  No stones are identified within the CBD.    The liver measures 21 cm.  The hepatic parenchyma is heterogeneous.  There is fatty infiltration of the liver.  No discrete masses  are identified.    The right kidney measures 10.8 x 5.1 x 5.3 cm.  There is a small cyst in the upper pole of the right kidney measuring 9 mm.  There is no evidence of nephrolithiasis or hydroureteronephrosis.    There is no evidence of free fluid.            Na 123  K3.1    Plan:   Fluid restriction.  Rx K+  F/U BMP in am.                      All above Labs and Tests have been reviewed.    Ming Sage

## 2017-03-18 NOTE — NURSING
Informed Dr. Sage via telephone of patients complaints of lower lumbar pain on scale of 5/10 with request for tylenol. New orders noted

## 2017-03-18 NOTE — PLAN OF CARE
Pt remains free from injury or falls. Vital signs stable throughout shift on room air. Bed in low locked position and call light within reach.  Will continue to monitor.

## 2017-03-19 VITALS
OXYGEN SATURATION: 100 % | DIASTOLIC BLOOD PRESSURE: 65 MMHG | WEIGHT: 257 LBS | SYSTOLIC BLOOD PRESSURE: 109 MMHG | BODY MASS INDEX: 41.3 KG/M2 | HEIGHT: 66 IN | RESPIRATION RATE: 18 BRPM | TEMPERATURE: 98 F | HEART RATE: 62 BPM

## 2017-03-19 LAB
ANION GAP SERPL CALC-SCNC: 7 MMOL/L
BUN SERPL-MCNC: 6 MG/DL
CALCIUM SERPL-MCNC: 8 MG/DL
CHLORIDE SERPL-SCNC: 94 MMOL/L
CO2 SERPL-SCNC: 27 MMOL/L
CREAT SERPL-MCNC: 0.8 MG/DL
EST. GFR  (AFRICAN AMERICAN): >60 ML/MIN/1.73 M^2
EST. GFR  (NON AFRICAN AMERICAN): >60 ML/MIN/1.73 M^2
GLUCOSE SERPL-MCNC: 81 MG/DL
POTASSIUM SERPL-SCNC: 4 MMOL/L
SODIUM SERPL-SCNC: 128 MMOL/L

## 2017-03-19 PROCEDURE — 36415 COLL VENOUS BLD VENIPUNCTURE: CPT

## 2017-03-19 PROCEDURE — 63600175 PHARM REV CODE 636 W HCPCS: Performed by: INTERNAL MEDICINE

## 2017-03-19 PROCEDURE — G0008 ADMIN INFLUENZA VIRUS VAC: HCPCS | Performed by: INTERNAL MEDICINE

## 2017-03-19 PROCEDURE — 25000003 PHARM REV CODE 250: Performed by: INTERNAL MEDICINE

## 2017-03-19 PROCEDURE — 3E0234Z INTRODUCTION OF SERUM, TOXOID AND VACCINE INTO MUSCLE, PERCUTANEOUS APPROACH: ICD-10-PCS | Performed by: INTERNAL MEDICINE

## 2017-03-19 PROCEDURE — 80048 BASIC METABOLIC PNL TOTAL CA: CPT

## 2017-03-19 PROCEDURE — 25000003 PHARM REV CODE 250: Performed by: PHYSICIAN ASSISTANT

## 2017-03-19 PROCEDURE — 90686 IIV4 VACC NO PRSV 0.5 ML IM: CPT | Performed by: INTERNAL MEDICINE

## 2017-03-19 PROCEDURE — 90471 IMMUNIZATION ADMIN: CPT | Performed by: INTERNAL MEDICINE

## 2017-03-19 RX ADMIN — INFLUENZA A VIRUS A/CALIFORNIA/7/2009 X-179A (H1N1) ANTIGEN (FORMALDEHYDE INACTIVATED), INFLUENZA A VIRUS A/HONG KONG/4801/2014 X-263B (H3N2) ANTIGEN (FORMALDEHYDE INACTIVATED), INFLUENZA B VIRUS B/PHUKET/3073/2013 ANTIGEN (FORMALDEHYDE INACTIVATED), AND INFLUENZA B VIRUS B/BRISBANE/60/2008 ANTIGEN (FORMALDEHYDE INACTIVATED) 0.5 ML: 15; 15; 15; 15 INJECTION, SUSPENSION INTRAMUSCULAR at 12:03

## 2017-03-19 RX ADMIN — ARIPIPRAZOLE 2 MG: 2 TABLET ORAL at 10:03

## 2017-03-19 RX ADMIN — ACETAMINOPHEN 650 MG: 325 TABLET ORAL at 10:03

## 2017-03-19 RX ADMIN — ATORVASTATIN CALCIUM 20 MG: 20 TABLET, FILM COATED ORAL at 10:03

## 2017-03-19 RX ADMIN — LOSARTAN POTASSIUM 100 MG: 50 TABLET, FILM COATED ORAL at 10:03

## 2017-03-19 RX ADMIN — SERTRALINE HYDROCHLORIDE 200 MG: 100 TABLET ORAL at 06:03

## 2017-03-19 RX ADMIN — DILTIAZEM HYDROCHLORIDE 180 MG: 180 CAPSULE, COATED, EXTENDED RELEASE ORAL at 10:03

## 2017-03-19 RX ADMIN — CLOPIDOGREL BISULFATE 75 MG: 75 TABLET, FILM COATED ORAL at 10:03

## 2017-03-20 NOTE — DISCHARGE SUMMARY
"HISTORY OF PRESENT ILLNESS AND HOSPITAL COURSE:  Mr. Jasso is a 56-year-old   gentleman who presented to the Emergency Room with the complaints of profound   weakness, nausea, lack of appetite, lightheadedness and feeling faint for the   last 3 or 4 days prior to this admission.  He says his sister took his blood   pressure, it was noted to be in the 80s, he had some chicken broth and felt   transiently a little better.  He recognizes that he has been drinking   excessively for the last year or so, drinks "all night long," he drinks 2 pints   of scotch and at least 6 beers all night.  He has quit smoking as of February of   this year, and two weeks ago, has started using his CPAP mask again, is not   very happy with this.  He has had obesity, had a gastric bypass surgery about 20   years ago, recently has been gaining weight; more so after he quit smoking and   after taking Abilify.  He has quit smoking as of February.  Last year, he had   angina, had a stent placed in his right coronary artery.  On examination, he was   noted to have normal vital signs, abdominal obesity, and otherwise the physical   examination was unremarkable.  The serum sodium level was 116.  The serum   potassium level was 2.8.  An ultrasound of the right upper quadrant of the   abdomen showed fatty liver.  No splenomegaly.    IMPRESSION ON ADMISSION:  1.  Profound hypokalemia, on daily hydrochlorothiazide.  2.  Hypokalemia, also due to hydrochlorothiazide.  3.  Morbid obesity.  4.  Obstructive sleep apnea.  5.  Stable coronary artery disease.  6.  Hypertension.  7.  Alcoholism.  8.  Ex-cigarette smoker.    Initially, he was started on IV normal saline with potassium, and he was also   given oral potassium.  The following morning, he said he felt much better, note   that he was also given oral Librium to prevent symptoms of alcohol withdrawal.    He was given another dose of oral potassium and continued on the IV fluids, the   following " "day the serum potassium level was 3.1, his serum sodium level was 123.    The IV fluids were discontinued, he was placed on fluid restriction to 800 mL   daily, was given a further dose of potassium.  The Librium was continued at 10   mg every 6 hours.  The following morning, he said he felt "great" for the first   time in several months.  His appetite is back, he has no further nausea or   weakness.  His serum sodium level is 128.  His potassium level is 4.0.  At the   time of discharge, he was advised to continue to withhold the   hydrochlorothiazide for the next couple of days to restrict his fluid intake.  I   will see him back in the office in 1 week, and at that time have repeat of his   BMP.  He is to check with his psychiatrist about whether he wants him to   continue the Abilify, he will also continue the Wellbutrin and Zoloft.  Note   that he has gained a fair amount of weight on Abilify.  He says that he is   determined to not drink again and not smoke again.  He does not want any further   prescriptions for Librium.  He feels he is over his fear of alcohol withdrawal.    He is being discharged in a stable and satisfactory condition, and he will   continue all his home medications with the exception of hydrochlorothiazide.      NEERU/  dd: 03/19/2017 11:50:49 (CDT)  td: 03/20/2017 00:03:46 (CDT)  Doc ID   #[id number]  Job ID #[id number]    CC:   "

## 2017-03-20 NOTE — PHYSICIAN QUERY
"PT Name: Leland Jasso  MR #: [id number]     Physician Query Form - Documentation Clarification      CDS/: Armida Orourke RN, CCDS              Contact information:[phone]     This form is a permanent document in the medical record.     Query Date: March 20, 2017    By submitting this query, we are merely seeking further clarification of documentation.  Please utilize your independent clinical judgment when addressing the question(s) below.     The Medical record contains the following:    Findings Supporting Clinical Information Location in Medical Record     "He says he now drinks all night long, between 6 beers and 2 pints of scotch each night.   "He says that he is determined to not drink again"                       "Profound hyponatremia, possibly from the daily use of hydrochlorothiazide.   Hypokalemia, possibly due to hydrochlorothiazide."        "Hypokalemia, also due to hydrochlorothiazide"       H&P 3/17/17      Discharge summary 3/19/17      H&P 3/17/17              Discharge Summary 3/19/17       Please clarify if ____hydrochlorothiazide._____ was:                                       (medication/drug)        [ x ] Correctly prescribed and properly administered    [  ] Incorrect drug/dose, improperly administered    [  ] Interaction of drug/medicine(s) and alcohol    [  ] Other: _________________________________    [  ] Clinically undetermined            Please document in your progress notes daily for the duration of treatment, until resolved, and include in your discharge summary.                                                                                "

## 2017-03-21 ENCOUNTER — PATIENT OUTREACH (OUTPATIENT)
Dept: ADMINISTRATIVE | Facility: CLINIC | Age: 57
End: 2017-03-21
Payer: MEDICARE

## 2017-03-21 NOTE — PATIENT INSTRUCTIONS
Discharge Instructions for Hyponatremia  You were diagnosed with hyponatremia, which means your blood level of sodium (salt) is too low. Salt is needed for the body and brain to work. Very low blood levels of sodium can be fatal. Symptoms can include headache, confusion, fatigue, muscle cramps, hallucinations, seizures, and coma. You have been treated to raise your blood levels of sodium. The following instructions will help you care for yourself at home as you have been instructed.  Home care  · Limit your intake of fluids. Drink only the amounts directed by your healthcare provider.  · Ask your healthcare provider what you should use to replace fluids if you are throwing up.  · Keep all follow-up appointments. Your provider needs to watch your condition closely.  To help prevent hyponatremia:  · Take all medicines exactly as directed. Certain medicines can lower blood sodium levels.  · If you have done something that makes you sweat a lot, drink fluids that contain salt and other electrolytes.   · Tell all healthcare providers what medicines you take. Mention all prescription and over-the-counter drugs and herbs.  · Have your sodium levels checked often. This is vital if you take a diuretic (medicine that helps your body get rid of water).  Follow-up  Schedule a follow-up visit as directed.  When to call your healthcare provider  Call your provider right away if you have any of the following:  · Severe tiredness  · Fainting  · Dizziness  · Loss of appetite  · Nausea or vomiting  · Confusion or forgetfullness  · Muscle spasms, cramping, or twitching  · Seizures  · Gait disturbances   Date Last Reviewed: 6/8/2015  © 1561-5703 [employer]. [address] [place] [address], [place] [address]. All rights reserved. This information is not intended as a substitute for professional medical care. Always follow your healthcare professional's instructions.

## 2017-03-24 ENCOUNTER — HOSPITAL ENCOUNTER (EMERGENCY)
Facility: OTHER | Age: 57
Discharge: HOME | End: 2017-03-24
Attending: EMERGENCY MEDICINE
Payer: MEDICARE

## 2017-03-24 VITALS
TEMPERATURE: 98 F | WEIGHT: 265 LBS | DIASTOLIC BLOOD PRESSURE: 63 MMHG | SYSTOLIC BLOOD PRESSURE: 120 MMHG | OXYGEN SATURATION: 98 % | BODY MASS INDEX: 42.59 KG/M2 | RESPIRATION RATE: 18 BRPM | HEART RATE: 58 BPM | HEIGHT: 66 IN

## 2017-03-24 DIAGNOSIS — R25.1 TREMOR OF BOTH HANDS: ICD-10-CM

## 2017-03-24 DIAGNOSIS — M79.89 LEG SWELLING: Primary | ICD-10-CM

## 2017-03-24 LAB
ALBUMIN SERPL BCP-MCNC: 3 G/DL
ALP SERPL-CCNC: 93 U/L
ALT SERPL W/O P-5'-P-CCNC: 51 U/L
ANION GAP SERPL CALC-SCNC: 5 MMOL/L
AST SERPL-CCNC: 48 U/L
BASOPHILS # BLD AUTO: 0 K/UL
BASOPHILS NFR BLD: 0 %
BILIRUB SERPL-MCNC: 0.3 MG/DL
BILIRUB UR QL STRIP: NEGATIVE
BNP SERPL-MCNC: 148 PG/ML
BUN SERPL-MCNC: 8 MG/DL
CALCIUM SERPL-MCNC: 8 MG/DL
CHLORIDE SERPL-SCNC: 105 MMOL/L
CLARITY UR: CLEAR
CO2 SERPL-SCNC: 24 MMOL/L
COLOR UR: YELLOW
CREAT SERPL-MCNC: 0.7 MG/DL
DIFFERENTIAL METHOD: ABNORMAL
EOSINOPHIL # BLD AUTO: 0 K/UL
EOSINOPHIL NFR BLD: 0.4 %
ERYTHROCYTE [DISTWIDTH] IN BLOOD BY AUTOMATED COUNT: 14.7 %
EST. GFR  (AFRICAN AMERICAN): >60 ML/MIN/1.73 M^2
EST. GFR  (NON AFRICAN AMERICAN): >60 ML/MIN/1.73 M^2
GLUCOSE SERPL-MCNC: 79 MG/DL
GLUCOSE UR QL STRIP: NEGATIVE
HCT VFR BLD AUTO: 32.8 %
HGB BLD-MCNC: 10.8 G/DL
HGB UR QL STRIP: NEGATIVE
KETONES UR QL STRIP: NEGATIVE
LEUKOCYTE ESTERASE UR QL STRIP: NEGATIVE
LIPASE SERPL-CCNC: 61 U/L
LYMPHOCYTES # BLD AUTO: 0.6 K/UL
LYMPHOCYTES NFR BLD: 13 %
MAGNESIUM SERPL-MCNC: 2.4 MG/DL
MCH RBC QN AUTO: 31.9 PG
MCHC RBC AUTO-ENTMCNC: 32.9 %
MCV RBC AUTO: 97 FL
MONOCYTES # BLD AUTO: 0.4 K/UL
MONOCYTES NFR BLD: 7.4 %
NEUTROPHILS # BLD AUTO: 3.8 K/UL
NEUTROPHILS NFR BLD: 79 %
NITRITE UR QL STRIP: NEGATIVE
PH UR STRIP: 8 [PH] (ref 5–8)
PHOSPHATE SERPL-MCNC: 2.7 MG/DL
PLATELET # BLD AUTO: 181 K/UL
PMV BLD AUTO: 8.7 FL
POTASSIUM SERPL-SCNC: 4.9 MMOL/L
PROT SERPL-MCNC: 5.7 G/DL
PROT UR QL STRIP: NEGATIVE
RBC # BLD AUTO: 3.39 M/UL
SODIUM SERPL-SCNC: 134 MMOL/L
SP GR UR STRIP: 1.01 (ref 1–1.03)
URN SPEC COLLECT METH UR: NORMAL
UROBILINOGEN UR STRIP-ACNC: NEGATIVE EU/DL
WBC # BLD AUTO: 4.85 K/UL

## 2017-03-24 PROCEDURE — 80053 COMPREHEN METABOLIC PANEL: CPT

## 2017-03-24 PROCEDURE — 83690 ASSAY OF LIPASE: CPT

## 2017-03-24 PROCEDURE — 83880 ASSAY OF NATRIURETIC PEPTIDE: CPT

## 2017-03-24 PROCEDURE — 84100 ASSAY OF PHOSPHORUS: CPT

## 2017-03-24 PROCEDURE — 81003 URINALYSIS AUTO W/O SCOPE: CPT

## 2017-03-24 PROCEDURE — 85025 COMPLETE CBC W/AUTO DIFF WBC: CPT

## 2017-03-24 PROCEDURE — 83735 ASSAY OF MAGNESIUM: CPT

## 2017-03-24 PROCEDURE — 99284 EMERGENCY DEPT VISIT MOD MDM: CPT

## 2017-03-24 NOTE — ED NOTES
Patient moved to ED room 8 via triage nurse, patient assisted onto stretcher and changed into a gown. Patient placed on cardiac monitor, continuous pulse oximetry and automatic blood pressure cuff. Bed placed in low locked position, side rails up x 2, call light is within reach of patient or family, orientation to room and explanation of wait provided to family and patient, alarms set and turned on for monitor and pulse ox, awaiting MD evaluation and orders, will continue to monitor.

## 2017-03-24 NOTE — ED PROVIDER NOTES
Encounter Date: 3/24/2017    SCRIBE #1 NOTE: I, Vera Pike, am scribing for, and in the presence of, Dr. Biggs.       History     Chief Complaint   Patient presents with    Leg Swelling     Pt seen here last week with low sodium and low potassium - states now bilateral pedal edema since yesterday and having difficulty with hand and arm shaking     Review of patient's allergies indicates:  No Known Allergies  HPI Comments: Time seen by provider: 12:44 PM    This is a 56 y.o. male who presents with complaint of bilateral leg swelling that began yesterday. He reports elevating his legs without relief. He denies previous leg swelling. He was admitted to his hospital last week for hyponitremia and hypokalemia. He also reports mild tremors in the bilateral hands, bilateral leg weakness, and dizziness that began five days ago, upon discharge. He reports difficulty grabbing objects, and he has trouble writing his name. He denies chest pain, shortness of breath, and headache.     The history is provided by the patient and the spouse.     Past Medical History:   Diagnosis Date    Coronary artery disease     Hypercholesteremia     Hypertension     Sleep apnea     Does not use CPAP     Past Surgical History:   Procedure Laterality Date    CHOLECYSTECTOMY      2005    CORONARY STENT PLACEMENT  2014    1 stent placed    GASTRIC BYPASS  2003     History reviewed. No pertinent family history.  Social History   Substance Use Topics    Smoking status: Current Every Day Smoker     Packs/day: 1.00     Years: 6.00     Types: Cigarettes     Start date: 7/20/2013    Smokeless tobacco: None      Comment: 1 PPD    Alcohol use Yes      Comment: Socially     Review of Systems   Constitutional: Negative for chills and fever.   HENT: Negative for congestion and facial swelling.    Respiratory: Negative for shortness of breath.    Cardiovascular: Positive for leg swelling. Negative for chest pain.   Gastrointestinal: Negative  for abdominal pain, nausea and vomiting.   Endocrine: Negative for polyuria.   Genitourinary: Negative for dysuria.   Musculoskeletal: Negative for myalgias.   Skin: Negative for rash.   Neurological: Positive for dizziness, tremors (bilateral hands) and weakness (bilateral legs). Negative for headaches.       Physical Exam   Initial Vitals   BP Pulse Resp Temp SpO2   03/24/17 1152 03/24/17 1152 03/24/17 1152 03/24/17 1152 03/24/17 1152   118/78 70 18 98.4 °F (36.9 °C) 98 %     Physical Exam    Nursing note and vitals reviewed.  Constitutional: He appears well-developed and well-nourished. He is not diaphoretic. No distress.   Obese.    HENT:   Head: Normocephalic and atraumatic.   Right Ear: External ear normal.   Left Ear: External ear normal.   Eyes: EOM are normal. Right eye exhibits no discharge. Left eye exhibits no discharge.   Neck: Normal range of motion.   Cardiovascular: Normal rate, regular rhythm and normal heart sounds. Exam reveals no gallop and no friction rub.    No murmur heard.  Pulmonary/Chest: Breath sounds normal. No respiratory distress. He has no wheezes. He has no rhonchi. He has no rales.   Abdominal: Soft. There is no tenderness. There is no rebound and no guarding.   Musculoskeletal: Normal range of motion. He exhibits edema. He exhibits no tenderness.   2-3+ lower extremity edema and venous stasis skin coloration changes.    Neurological: He is alert and oriented to person, place, and time.   Good  strength in the bilateral upper and lower extremities. No neurologic deficits. Mild essential tremor.    Skin: Skin is warm and dry. No rash noted. No pallor.   Psychiatric: He has a normal mood and affect. Thought content normal.         ED Course   Procedures  Labs Reviewed   CBC W/ AUTO DIFFERENTIAL - Abnormal; Notable for the following:        Result Value    RBC 3.39 (*)     Hemoglobin 10.8 (*)     Hematocrit 32.8 (*)     MCH 31.9 (*)     RDW 14.7 (*)     MPV 8.7 (*)     Lymph # 0.6  (*)     Gran% 79.0 (*)     Lymph% 13.0 (*)     All other components within normal limits   COMPREHENSIVE METABOLIC PANEL - Abnormal; Notable for the following:     Sodium 134 (*)     Calcium 8.0 (*)     Total Protein 5.7 (*)     Albumin 3.0 (*)     AST 48 (*)     ALT 51 (*)     Anion Gap 5 (*)     All other components within normal limits   LIPASE - Abnormal; Notable for the following:     Lipase 61 (*)     All other components within normal limits   B-TYPE NATRIURETIC PEPTIDE - Abnormal; Notable for the following:      (*)     All other components within normal limits   MAGNESIUM   PHOSPHORUS   URINALYSIS   MAGNESIUM   PHOSPHORUS             Medical Decision Making:   History:   Old Medical Records: I decided to obtain old medical records.  Old Records Summarized: records from previous admission(s).       <> Summary of Records: Patient was admitted on 03/16 for sodium of 118 and potassium of 2.8. Also mildly elevated LFTs. Discharged on 03/19 with sodium of 128 and potassium of 4.0. Right upper quadrant ultrasound showed hepatic steatosis and post cholecystectomy.   Clinical Tests:   Lab Tests: Ordered and Reviewed  ED Management:  56 y.o. male with recent admission for hyponitremia and hypokalemia. Symptoms are likely due to significant electrolyte shift in the last few weeks. His tremors are likely secondary to abstinence from alcohol since his recent admission. Will obtain blood work to evaluate for kidney failure or electrolyte abnormalities. No focal neurologic deficits to suggest intracranial abnormality. Do not believe CT is warranted.     3:05 PM: Patient's potassium is 4.9 and sodium is 134. Discussed the case with Dr. Sage, patient's cardiologist, who recommends stopping the diuretic and having the patient wear compression stockings. He will follow up in the clinic as an outpatient.               Scribe Attestation:   Scribe #1: I performed the above scribed service and the documentation  accurately describes the services I performed. I attest to the accuracy of the note.    Attending Attestation:           Physician Attestation for Scribe:  Physician Attestation Statement for Scribe #1: I, Dr. Biggs, reviewed documentation, as scribed by Vera Pike in my presence, and it is both accurate and complete.                 ED Course     Clinical Impression:     1. Leg swelling    2. Tremor of both hands             Kel Biggs, DO  03/24/17 [id number]

## 2017-03-24 NOTE — ED NOTES
Patient identifiers verified and correct for Leland HUONG Margaritavarinder.  Pt denies chest pain, SOB, blurred vision or dizziness currently.   LOC: The patient is awake, alert and aware of environment with an appropriate affect, the patient is oriented x 3 and speaking appropriately.   APPEARANCE: Patient resting comfortably and in no acute distress, patient is clean and well groomed, patient's clothing is properly fastened.  SKIN: The skin is warm and dry, color consistent with ethnicity, patient has normal skin turgor and moist mucus membranes, skin intact, no breakdown or bruising noted.   MUSCULOSKELETAL: Patient moving all extremities spontaneously, no obvious swelling or deformities noted.  RESPIRATORY: Airway is open and patent, respirations are spontaneous, patient has a normal effort and rate, no accessory muscle use noted, bilateral breath sounds clear.  CARDIAC: Patient has a normal rate and regular rhythm, +2 bilateral lower periphreal edema noted, capillary refill < 3 seconds.  ABDOMEN: Soft and non tender to palpation, no distention noted.  NEUROLOGIC: PERRL, 3 mm bilaterally, eyes open spontaneously, behavior appropriate to situation, follows commands, facial expression symmetrical, bilateral hand grasp equal and even, purposeful motor response noted, normal sensation in all extremities when touched with a finger, + intermittent numbness/tinlging to bilateral lower extremities.

## 2017-03-24 NOTE — ED AVS SNAPSHOT
[facility]  [address]  [facility] [address]               Leland Jasso   3/24/2017 11:55 AM   ED    Description:  Male : 1960   Department:  [facility]           Your Care was Coordinated By:     Provider Role From To    Kel Biggs DO Attending Provider 17 1207 --      Reason for Visit     Leg Swelling           Diagnoses this Visit        Comments    Leg swelling    -  Primary     Tremor of both hands           ED Disposition     None           To Do List           Follow-up Information     Follow up with Ming Sage MD In 1 week.    Specialty:  Cardiology    Contact information:    [address]  [facility] [address]  [phone]        [facility]     [facility] Care [facility] -  Assistance  Registered nurses in the [facility] provide clinical advisement, health education, appointment booking, and other advisory services.  Call for this free service at [phone].             Medications           Message regarding Medications     Verify the changes and/or additions to your medication regime listed below are the same as discussed with your clinician today.  If any of these changes or additions are incorrect, please notify your healthcare provider.             Verify that the below list of medications is an accurate representation of the medications you are currently taking.  If none reported, the list may be blank. If incorrect, please contact your healthcare provider. Carry this list with you in case of emergency.           Current Medications     ABILIFY 2 mg Tab Take 2 mg by mouth once daily.     atorvastatin (LIPITOR) 20 MG tablet Take 1 tablet by mouth every day    buPROPion (WELLBUTRIN XL) 300 MG 24 hr tablet Take 300 mg by mouth once daily.    clopidogrel (PLAVIX) 75 mg tablet Take 1 tablet by mouth every day    diltiaZEM (TIAZAC) 180 MG CpSR Take 180 mg by mouth once daily.  "   ferrous sulfate 325 mg (65 mg iron) Tab tablet Take 325 mg by mouth daily with breakfast.    losartan (COZAAR) 100 MG tablet Take 1 tablet by mouth every day    ZOLOFT 100 mg tablet Take 100 mg by mouth every morning.            Clinical Reference Information           Your Vitals Were     BP Pulse Temp Resp Height Weight    120/63 58 98.4 °F (36.9 °C) (Oral) 18 5' 6" (1.676 m) 120.2 kg (265 lb)    SpO2 BMI             100% 42.77 kg/m2         Allergies as of 3/24/2017     No Known Allergies      Immunizations Administered on Date of Encounter - 3/24/2017     None      ED Micro, Lab, POCT     Start Ordered       Status Ordering Provider    03/24/17 1251 03/24/17 1250  Magnesium  Add-on      Completed     03/24/17 1251 03/24/17 1250  Phosphorus  Add-on      Completed     03/24/17 1251 03/24/17 1250  Urinalysis  STAT      Final result     03/24/17 1217 03/24/17 1216  CBC auto differential  STAT      Final result     03/24/17 1217 03/24/17 1216  Comprehensive metabolic panel  STAT      Final result     03/24/17 1217 03/24/17 1216  Lipase  STAT      Final result     03/24/17 1217 03/24/17 1216  Brain natriuretic peptide  STAT      Final result     03/24/17 1216 03/24/17 1216  Magnesium  Once      Final result     03/24/17 1216 03/24/17 1216  Phosphorus  Once      Final result       ED Imaging Orders     None        Discharge Instructions       Keeps leg elevated at home. Use compression stockings. The tremors should slowly resolve.    Discharge References/Attachments     LEG SWELLING IN BOTH LEGS (ENGLISH)      Your Scheduled Appointments     Mar 29, 2017  1:00 PM CDT   Established Patient Visit with Ming Sage MD   [facility]    [address], [address]  [facility] [address]   [phone]            Apr 25, 2017  3:20 PM CDT   Established Patient Visit with Ming Sage MD   [facility]    [address], [address]  [place] [facility] [address]   [phone]              [facility] Sign-Up     Activating your [facility] account is as easy as 1-2-3!     1) Visit [email or web address], select Sign Up Now, enter this activation code and your date of birth, then select Next.  [email or web address]  Expires: 5/3/2017 11:35 AM      2) Create a username and password to use when you visit [facility] in the future and select a security question in case you lose your password and select Next.    3) Enter your e-mail address and click Sign Up!    Additional Information  If you have questions, please e-mail [email or web address] or call [phone] to talk to our [facility] staff. Remember, [facility] is NOT to be used for urgent needs. For medical emergencies, dial 911.         Smoking Cessation     If you would like to quit smoking:   You may be eligible for free services if you are a [place] resident and started smoking cigarettes before September 1, 1988.  Call the Smoking Cessation Trust ([facility]) toll free at (919) 352-1787 or (606) 503-8900.   Call 9-732-QUIT-NOW if you do not meet the above criteria.             [facility] complies with applicable Federal civil rights laws and does not discriminate on the basis of race, color, national origin, age, disability, or sex.        Language Assistance Services     ATTENTION: Language assistance services are available, free of charge. Please call [phone].      ATENCIÓN: Si habla español, tiene a cummings disposición servicios gratuitos de asistencia lingüística. Llame al [phone].     CHÚ Ý: N?u b?n nói Ti?ng Vi?t, có các d?ch v? h? tr? ngôn ng? mi?n phí dành cho b?n. G?i s? [phone].

## 2017-03-24 NOTE — ED TRIAGE NOTES
"Pt presents to ER w/ + bilateral lower extremity swelling w/ new onset yesterday. Pt states," I was just discharged on Sunday. I had a low sodium and potassium level". Pt reports + BLE intermittent numbness and tingling w/ a rated pain 4/10 on pain scale. Pt reports intermittent dizziness. Denies chest pain, SOB, blurred vision, N/V/D, chills or fever.  "

## 2017-03-24 NOTE — ED NOTES
Discharge papers reviewed with patient.  Patient verbalized understanding.  Patient directed to discharge window.  Paperwork given to registration.

## 2017-03-24 NOTE — ED NOTES
Patient resting comfortably in bed.  No signs of distress noted.  Patient has a visitor at the bedside.  Call light within reach.  Bed in the low and locked position.  Updated patient on his care.  Will continue to monitor.

## 2017-03-31 ENCOUNTER — HOSPITAL ENCOUNTER (OUTPATIENT)
Dept: RADIOLOGY | Facility: OTHER | Age: 57
Discharge: HOME | End: 2017-03-31
Attending: INTERNAL MEDICINE
Payer: MEDICARE

## 2017-03-31 DIAGNOSIS — R60.0 EDEMA OF BOTH LEGS: ICD-10-CM

## 2017-03-31 PROCEDURE — 74177 CT ABD & PELVIS W/CONTRAST: CPT | Mod: 26,,, | Performed by: RADIOLOGY

## 2017-03-31 PROCEDURE — 74177 CT ABD & PELVIS W/CONTRAST: CPT | Mod: TC

## 2017-03-31 PROCEDURE — 25500020 PHARM REV CODE 255: Performed by: INTERNAL MEDICINE

## 2017-03-31 RX ADMIN — IOHEXOL 100 ML: 350 INJECTION, SOLUTION INTRAVENOUS at 10:03

## 2017-03-31 RX ADMIN — IOHEXOL 25 ML: 350 INJECTION, SOLUTION INTRAVENOUS at 10:03

## 2017-05-23 PROBLEM — F10.20 ALCOHOLISM: Status: ACTIVE | Noted: 2017-05-23

## 2017-11-26 ENCOUNTER — HOSPITAL ENCOUNTER (EMERGENCY)
Facility: OTHER | Age: 57
Discharge: HOME | End: 2017-11-26
Attending: EMERGENCY MEDICINE
Payer: MEDICARE

## 2017-11-26 VITALS
DIASTOLIC BLOOD PRESSURE: 90 MMHG | RESPIRATION RATE: 28 BRPM | BODY MASS INDEX: 43.87 KG/M2 | SYSTOLIC BLOOD PRESSURE: 152 MMHG | HEIGHT: 66 IN | WEIGHT: 273 LBS | TEMPERATURE: 98 F | HEART RATE: 70 BPM | OXYGEN SATURATION: 98 %

## 2017-11-26 DIAGNOSIS — M79.661 TENDERNESS OF RIGHT CALF: ICD-10-CM

## 2017-11-26 DIAGNOSIS — R53.81 MALAISE AND FATIGUE: Primary | ICD-10-CM

## 2017-11-26 DIAGNOSIS — R53.83 MALAISE AND FATIGUE: Primary | ICD-10-CM

## 2017-11-26 DIAGNOSIS — E87.1 HYPONATREMIA: ICD-10-CM

## 2017-11-26 DIAGNOSIS — T50.2X5A ADVERSE EFFECT OF HYDROCHLOROTHIAZIDE, INITIAL ENCOUNTER: ICD-10-CM

## 2017-11-26 LAB
ALBUMIN SERPL BCP-MCNC: 3.3 G/DL
ALP SERPL-CCNC: 87 U/L
ALT SERPL W/O P-5'-P-CCNC: 16 U/L
ANION GAP SERPL CALC-SCNC: 10 MMOL/L
AST SERPL-CCNC: 20 U/L
BASOPHILS # BLD AUTO: 0 K/UL
BASOPHILS NFR BLD: 0 %
BILIRUB SERPL-MCNC: 0.3 MG/DL
BILIRUB UR QL STRIP: NEGATIVE
BNP SERPL-MCNC: 62 PG/ML
BUN SERPL-MCNC: 6 MG/DL
CALCIUM SERPL-MCNC: 9.1 MG/DL
CHLORIDE SERPL-SCNC: 94 MMOL/L
CLARITY UR: CLEAR
CO2 SERPL-SCNC: 25 MMOL/L
COLOR UR: YELLOW
CREAT SERPL-MCNC: 0.8 MG/DL
DIFFERENTIAL METHOD: ABNORMAL
EOSINOPHIL # BLD AUTO: 0.1 K/UL
EOSINOPHIL NFR BLD: 0.6 %
ERYTHROCYTE [DISTWIDTH] IN BLOOD BY AUTOMATED COUNT: 13.6 %
EST. GFR  (AFRICAN AMERICAN): >60 ML/MIN/1.73 M^2
EST. GFR  (NON AFRICAN AMERICAN): >60 ML/MIN/1.73 M^2
GLUCOSE SERPL-MCNC: 86 MG/DL
GLUCOSE UR QL STRIP: NEGATIVE
HCT VFR BLD AUTO: 30.1 %
HGB BLD-MCNC: 10.2 G/DL
HGB UR QL STRIP: NEGATIVE
KETONES UR QL STRIP: ABNORMAL
LEUKOCYTE ESTERASE UR QL STRIP: NEGATIVE
LYMPHOCYTES # BLD AUTO: 0.7 K/UL
LYMPHOCYTES NFR BLD: 9 %
MCH RBC QN AUTO: 31.7 PG
MCHC RBC AUTO-ENTMCNC: 33.9 G/DL
MCV RBC AUTO: 94 FL
MONOCYTES # BLD AUTO: 0.6 K/UL
MONOCYTES NFR BLD: 6.7 %
NEUTROPHILS # BLD AUTO: 6.8 K/UL
NEUTROPHILS NFR BLD: 83.3 %
NITRITE UR QL STRIP: NEGATIVE
PH UR STRIP: 7 [PH] (ref 5–8)
PLATELET # BLD AUTO: 364 K/UL
PMV BLD AUTO: 8.3 FL
POTASSIUM SERPL-SCNC: 4.3 MMOL/L
PROT SERPL-MCNC: 6.6 G/DL
PROT UR QL STRIP: NEGATIVE
RBC # BLD AUTO: 3.22 M/UL
SODIUM SERPL-SCNC: 129 MMOL/L
SP GR UR STRIP: 1.01 (ref 1–1.03)
TROPONIN I SERPL DL<=0.01 NG/ML-MCNC: <0.006 NG/ML
URN SPEC COLLECT METH UR: ABNORMAL
UROBILINOGEN UR STRIP-ACNC: NEGATIVE EU/DL
WBC # BLD AUTO: 8.18 K/UL

## 2017-11-26 PROCEDURE — 84484 ASSAY OF TROPONIN QUANT: CPT

## 2017-11-26 PROCEDURE — 83880 ASSAY OF NATRIURETIC PEPTIDE: CPT

## 2017-11-26 PROCEDURE — 99284 EMERGENCY DEPT VISIT MOD MDM: CPT | Mod: 25

## 2017-11-26 PROCEDURE — 93010 ELECTROCARDIOGRAM REPORT: CPT | Mod: ,,, | Performed by: INTERNAL MEDICINE

## 2017-11-26 PROCEDURE — 85025 COMPLETE CBC W/AUTO DIFF WBC: CPT

## 2017-11-26 PROCEDURE — 81003 URINALYSIS AUTO W/O SCOPE: CPT

## 2017-11-26 PROCEDURE — 93005 ELECTROCARDIOGRAM TRACING: CPT

## 2017-11-26 PROCEDURE — 80053 COMPREHEN METABOLIC PANEL: CPT

## 2017-11-26 PROCEDURE — 25000003 PHARM REV CODE 250: Performed by: EMERGENCY MEDICINE

## 2017-11-26 RX ORDER — OXYCODONE AND ACETAMINOPHEN 5; 325 MG/1; MG/1
2 TABLET ORAL
Status: COMPLETED | OUTPATIENT
Start: 2017-11-26 | End: 2017-11-26

## 2017-11-26 RX ADMIN — OXYCODONE HYDROCHLORIDE AND ACETAMINOPHEN 2 TABLET: 5; 325 TABLET ORAL at 07:11

## 2017-11-27 NOTE — ED NOTES
Pt requesting pain medicine for back pain. Pt reports he takes percocet 10 mg at home. MD notified.

## 2017-11-27 NOTE — ED PROVIDER NOTES
"Encounter Date: 11/26/2017    SCRIBE #1 NOTE: I, Brandi Turner, am scribing for, and in the presence of, Dr. Packer.       History     Chief Complaint   Patient presents with    Back Pain     w/ fatigue post lumbar fusion on 11/14/17. says pain medicine is not sufficient     Time seen by provider: 6:58 PM  This is a 57 y.o. male who presents with complaint of multiple complaints that began three hours ago. Pt reports general fatigue and malaise with associated nausea.  He states "I just think something's wrong and I want to get checked out."  He reports having a lumbar fusion surgery one week ago (11/14/17) and notes the incision site is healing well. He notes chronic constipation which he attributes to pain medications he's taking since surgery and sore throat which he attributes to breathing tube from surgery.  He decreased appetite, fatigue, rhinorrhea, sore throat, nausea, tremors, swelling and numbness to RLE, back pain, and light-headedness over the last few days.  He denies chest pain, SOB, chest tightness,  abdominal pain, vomiting, dysuria, hematuria, or numbness of tingling to genital area. Symptoms are described as constant.  He notes he has been compliant with pain medications and muscle relaxers, but experienced no relief today. He notes he stopped smoking one day prior to surgery, and has a pint of Scotch per night. He notes tremors occurs mainly at night, with or without alcohol.  He notes he has a cholecystectomy, gastric bypass, and a coronary stent placement.      The history is provided by the patient.     Review of patient's allergies indicates:  No Known Allergies  Past Medical History:   Diagnosis Date    Coronary artery disease     Hypercholesteremia     Hypertension     Sleep apnea     Does not use CPAP     Past Surgical History:   Procedure Laterality Date    CHOLECYSTECTOMY      2005    CORONARY STENT PLACEMENT  2014    1 stent placed    GASTRIC BYPASS  2003     History reviewed. No " pertinent family history.  Social History   Substance Use Topics    Smoking status: Current Every Day Smoker     Packs/day: 1.00     Years: 6.00     Types: Cigarettes     Start date: 7/20/2013    Smokeless tobacco: Never Used      Comment: 1 PPD    Alcohol use Yes      Comment: Socially     Review of Systems   Constitutional: Positive for appetite change and fatigue. Negative for chills, diaphoresis and fever.   HENT: Positive for rhinorrhea and sore throat. Negative for congestion and trouble swallowing.    Respiratory: Negative for cough, chest tightness and shortness of breath.    Cardiovascular: Positive for leg swelling. Negative for chest pain.   Gastrointestinal: Positive for constipation and nausea. Negative for abdominal pain, diarrhea and vomiting.   Genitourinary: Positive for difficulty urinating. Negative for discharge, dysuria, frequency, hematuria, penile pain, penile swelling, scrotal swelling and testicular pain.   Musculoskeletal: Positive for back pain. Negative for joint swelling, myalgias, neck pain and neck stiffness.   Skin: Negative for rash and wound.   Neurological: Positive for tremors, light-headedness and numbness. Negative for dizziness, syncope, weakness and headaches.       Physical Exam     Initial Vitals [11/26/17 1822]   BP Pulse Resp Temp SpO2   (!) 174/95 72 (!) 22 98.4 °F (36.9 °C) (!) 93 %      MAP       121.33         Physical Exam    Nursing note and vitals reviewed.  Constitutional: He appears well-developed and well-nourished. He is not diaphoretic. No distress.   HENT:   Head: Normocephalic and atraumatic.   Nose: Nose normal.   Mouth/Throat: Oropharynx is clear and moist.   Eyes: EOM are normal. Pupils are equal, round, and reactive to light.   Neck: Normal range of motion.   Cardiovascular: Normal rate, regular rhythm, normal heart sounds and intact distal pulses.   No murmur heard.  Pulmonary/Chest: Breath sounds normal. No respiratory distress. He has no rales.    Abdominal: Soft. Bowel sounds are normal. There is no tenderness.   Musculoskeletal: Normal range of motion. He exhibits edema. He exhibits no tenderness.   Mild, swelling to right calf with edema.   Neurological: He is alert and oriented to person, place, and time. He has normal strength. A sensory deficit (chronic, foot decreased sensation) is present. No cranial nerve deficit.   Skin: Skin is warm and dry.   Psychiatric: He has a normal mood and affect. His behavior is normal. Judgment and thought content normal.         ED Course   Procedures  Labs Reviewed   CBC W/ AUTO DIFFERENTIAL - Abnormal; Notable for the following:        Result Value    RBC 3.22 (*)     Hemoglobin 10.2 (*)     Hematocrit 30.1 (*)     MCH 31.7 (*)     Platelets 364 (*)     MPV 8.3 (*)     Lymph # 0.7 (*)     Gran% 83.3 (*)     Lymph% 9.0 (*)     All other components within normal limits   COMPREHENSIVE METABOLIC PANEL - Abnormal; Notable for the following:     Sodium 129 (*)     Chloride 94 (*)     Albumin 3.3 (*)     All other components within normal limits    Narrative:     Recoll. [id number] by OLR at 11/26/2017 19:46, reason: Specimen   hemolyzed spoke to Yanira Neil RN request lab recollect   URINALYSIS - Abnormal; Notable for the following:     Ketones, UA Trace (*)     All other components within normal limits   TROPONIN I    Narrative:     Recoll. [id number] by OLR at 11/26/2017 19:46, reason: Specimen   hemolyzed spoke to Yanira Neil RN request lab recollect   B-TYPE NATRIURETIC PEPTIDE     EKG Readings: (Independently Interpreted)   Initial Reading: No STEMI.   Normal Sinus Rhythm at a rate of 69 bpm.          Medical Decision Making:   Clinical Tests:   Lab Tests: Ordered and Reviewed  Radiological Study: Ordered and Reviewed  Medical Tests: Ordered and Reviewed  ED Management:  Emergent evaluation a 57-year-old male status post lumbar fusion surgery who presents with 1 day of malaise and fatigue, nausea.  He has other  "complaints as well which have been ongoing.  Physical exam is benign, and there is no evidence by history or exam of cauda equina syndrome or cord compression.  He is afebrile and I do not suspect a surgical infection or epidural abscess.  I considered possibility of "silent MI" but EKG is normal.  Patient's labs are significant for some mild hyponatremia.  He states he's had this problem in the past.  I could attribute his symptoms to this.  On his medication list he does take hydrochlorothiazide which is well known to cause hyponatremia.  I will discontinue this medication.  He was offered admission but I am comfortable with discharge home since symptoms and lab values are not extremely deranged.  Additionally, ultrasound of the leg was performed for swelling which shows no DVT.  He was discharged in good condition, advised to discontinue hydrochlorothiazide and to follow-up closely with his PCP or to return here for any new or worsening symptoms.    Imaging Results          US Lower Extremity Veins Right (Final result)  Result time 11/26/17 20:57:37    Final result by Snow Wong MD (11/26/17 20:57:37)                 Impression:      No evidence of right lower extremity DVT.        Electronically signed by: SNOW WONG MD  Date:     11/26/17  Time:    20:57              Narrative:    Reason for study: Rule out DVT     Comparison: None.    Technique: Routine right lower extremity venous ultrasound was performed using grayscale and color flow doppler spectral analysis.    Findings: No evidence of clot involving the bilateral common femoral veins or right greater saphenous, femoral, popliteal, peroneal, anterior and posterior tibial veins.  All venous structures demonstrate normal respiratory phasicity and augment adequately.  No evidence of soft tissue mass or Baker's cyst.                                      Scribe Attestation:   Scribe #1: I performed the above scribed service and the documentation " accurately describes the services I performed. I attest to the accuracy of the note.    Attending Attestation:           Physician Attestation for Scribe:  Physician Attestation Statement for Scribe #1: I, Dr. Packer, reviewed documentation, as scribed by Brandi Turner in my presence, and it is both accurate and complete.                 ED Course      Clinical Impression:     1. Malaise and fatigue    2. Tenderness of right calf    3. Hyponatremia    4. Adverse effect of hydrochlorothiazide, initial encounter                                 Christy Packer MD  11/29/17 1300

## 2017-11-27 NOTE — ED NOTES
Spouse just assisted w ambulation to restroom. Uses a walker for balance. Appears to be in no acute distress and states that pain has decreased to seven out of ten. No labored breathing noted, alert and oriented x 03. Bed in lowest position, wheels locked, and call light is within reach. Will continue to monitor.

## 2017-11-27 NOTE — ED TRIAGE NOTES
"Pt presents to the ED with c/o lower back pain. Pain is rated 10/10, described as "shooting and sharp", pain radiates down right leg. Pt states he had lower back surgery on the 14th and is taking prescribed percocet with relief. Pt's last dose was at 1500. Pt states he is also concerned about his blood pressure being high and that is the main thing that brought him in. + lightheaded, + nausea, + loss of apatite, - vomiting. Pt has hx of HTN and is compliant with medication. Pt has appt with the back doctor tomorrow.    "

## 2017-11-27 NOTE — DISCHARGE INSTRUCTIONS
Stop taking hydrochlorothiazide.  Follow up with your doctor within the next 48 hours.  Return to ER immediately for any new or worsening symptoms.

## 2017-12-03 ENCOUNTER — HOSPITAL ENCOUNTER (EMERGENCY)
Facility: OTHER | Age: 57
Discharge: HOME | End: 2017-12-03
Attending: EMERGENCY MEDICINE
Payer: MEDICARE

## 2017-12-03 VITALS
WEIGHT: 273 LBS | BODY MASS INDEX: 43.87 KG/M2 | SYSTOLIC BLOOD PRESSURE: 138 MMHG | TEMPERATURE: 98 F | DIASTOLIC BLOOD PRESSURE: 83 MMHG | HEART RATE: 80 BPM | OXYGEN SATURATION: 99 % | RESPIRATION RATE: 18 BRPM | HEIGHT: 66 IN

## 2017-12-03 DIAGNOSIS — M54.50 MIDLINE LOW BACK PAIN WITHOUT SCIATICA, UNSPECIFIED CHRONICITY: Primary | ICD-10-CM

## 2017-12-03 PROCEDURE — 63600175 PHARM REV CODE 636 W HCPCS: Performed by: EMERGENCY MEDICINE

## 2017-12-03 PROCEDURE — 96372 THER/PROPH/DIAG INJ SC/IM: CPT

## 2017-12-03 PROCEDURE — 99283 EMERGENCY DEPT VISIT LOW MDM: CPT | Mod: 25

## 2017-12-03 RX ORDER — OXYCODONE AND ACETAMINOPHEN 10; 325 MG/1; MG/1
1 TABLET ORAL EVERY 4 HOURS PRN
COMMUNITY
End: 2017-12-03 | Stop reason: SDUPTHER

## 2017-12-03 RX ORDER — ORPHENADRINE CITRATE 30 MG/ML
60 INJECTION INTRAMUSCULAR; INTRAVENOUS
Status: COMPLETED | OUTPATIENT
Start: 2017-12-03 | End: 2017-12-03

## 2017-12-03 RX ORDER — OXYCODONE AND ACETAMINOPHEN 10; 325 MG/1; MG/1
1 TABLET ORAL EVERY 6 HOURS PRN
Qty: 5 TABLET | Refills: 0 | Status: SHIPPED | OUTPATIENT
Start: 2017-12-03

## 2017-12-03 RX ORDER — KETOROLAC TROMETHAMINE 30 MG/ML
30 INJECTION, SOLUTION INTRAMUSCULAR; INTRAVENOUS
Status: COMPLETED | OUTPATIENT
Start: 2017-12-03 | End: 2017-12-03

## 2017-12-03 RX ADMIN — ORPHENADRINE CITRATE 60 MG: 30 INJECTION INTRAMUSCULAR; INTRAVENOUS at 04:12

## 2017-12-03 RX ADMIN — KETOROLAC TROMETHAMINE 30 MG: 30 INJECTION, SOLUTION INTRAMUSCULAR at 04:12

## 2017-12-03 NOTE — ED PROVIDER NOTES
"Encounter Date: 12/3/2017    SCRIBE #1 NOTE: I, Mame Wagoner, am scribing for, and in the presence of, Dr. Bernardo.       History     Chief Complaint   Patient presents with    Back Pain     pt states low back pain - hx of lumbar fusion 11/14 and states is out of pain medication and can't take the pain - pain to right sided of buttocks down into right leg - pt agitated and unable to sit still     Time seen by provider: 4:08 PM    This is a 57 y.o. male who presents with complaint of pain that has persisted since spinal surgery that was performed 19 days ago.  The patient reports that he recently had a spinal fusion of L4-L5 and S1-S2, which was performed by Dr. Schroeder at [facility].  Since the surgery, he has had "unbearable" pain, which radiates down the RLE, but his pain was manageable with narcotic pain medication.  Although he received a refill 1 week ago from Dr. Schroeder, he has been taking 20 mg percocet every 4 hours, rather than the prescribed dosage of 10 mg percocet every 6 hours.  The patient states that he has also been taking a muscle relaxant and steroid without relief.  He denies fever, chills, leg swelling, weakness, numbness, and tingling.  Although he has had constipation, he states that his last BM was this morning and has since resolved.  The patient admits that he has not attempted to alleviate his discomfort with any OTC medication.  As per medical records, he has history of HTN, HCL, and CAD.  He has further surgical history of coronary stent placement, gastric bypass surgery, and cholecystectomy.          The history is provided by the patient and medical records.     Review of patient's allergies indicates:  No Known Allergies  Past Medical History:   Diagnosis Date    Coronary artery disease     Hypercholesteremia     Hypertension     Sleep apnea     Does not use CPAP     Past Surgical History:   Procedure Laterality Date    BACK SURGERY      CHOLECYSTECTOMY      2005    CORONARY STENT " PLACEMENT  2014    1 stent placed    GASTRIC BYPASS  2003     History reviewed. No pertinent family history.  Social History   Substance Use Topics    Smoking status: Current Every Day Smoker     Packs/day: 1.00     Years: 6.00     Types: Cigarettes     Start date: 7/20/2013    Smokeless tobacco: Never Used      Comment: 1 cigarette per day as of 12/3/17    Alcohol use Yes      Comment: 1 pint of sctoch per night     Review of Systems   Constitutional: Negative for chills and fever.   HENT: Negative for congestion and facial swelling.    Respiratory: Negative for chest tightness and shortness of breath.    Cardiovascular: Negative for chest pain and leg swelling.   Gastrointestinal: Negative for abdominal pain, nausea and vomiting.   Endocrine: Negative for polyuria.   Genitourinary: Negative for dysuria.   Musculoskeletal: Positive for back pain. Negative for myalgias.   Skin: Negative for rash.   Neurological: Negative for weakness, numbness and headaches.        Negative for tingling.        Physical Exam     Initial Vitals [12/03/17 1524]   BP Pulse Resp Temp SpO2   (!) 190/105 80 18 99.2 °F (37.3 °C) 99 %      MAP       133.33         Physical Exam    Nursing note and vitals reviewed.  Constitutional: He appears well-developed and well-nourished. He is not diaphoretic. No distress.   HENT:   Head: Normocephalic and atraumatic.   Right Ear: External ear normal.   Left Ear: External ear normal.   Eyes: EOM are normal. Right eye exhibits no discharge. Left eye exhibits no discharge.   Neck: Normal range of motion.   Cardiovascular: Normal rate, regular rhythm and normal heart sounds. Exam reveals no gallop and no friction rub.    No murmur heard.  Pulmonary/Chest: Breath sounds normal. No respiratory distress. He has no wheezes. He has no rhonchi. He has no rales.   Abdominal: Soft. There is no tenderness. There is no rebound and no guarding.   Musculoskeletal: Normal range of motion. He exhibits no edema or  tenderness.   Neurological: He is alert and oriented to person, place, and time.   Skin: Skin is warm and dry. No rash and no abscess noted. No erythema. No pallor.   Psychiatric: He has a normal mood and affect. His behavior is normal. Judgment and thought content normal.         ED Course   Procedures  Labs Reviewed - No data to display   Imaging Results    None                 Medical Decision Making:   ED Management:  4:18 PM. Paging Dr. Schroeder.  Other:   I have discussed this case with another health care provider.       <> Summary of the Discussion: 4:21 PM. Case discussed with Dr. Schroeder, who recommends giving a prescription for 5 tablets and have the patient see him first thing tomorrow morning at the [place] office for further treatment and possible referral to pain management.             Scribe Attestation:   Scribe #1: I performed the above scribed service and the documentation accurately describes the services I performed. I attest to the accuracy of the note.    Attending Attestation:           Physician Attestation for Scribe:  Physician Attestation Statement for Scribe #1: I, Dr. Bernardo, reviewed documentation, as scribed by Mame Wagoner in my presence, and it is both accurate and complete.                 ED Course      Patient presents complaining of increased back pain after running out of his Percocet last night.  The patient had a lumbar her fusion a few weeks ago.  He has unchanged tingling in the a couple of his toes but no new neurologic symptoms no change in bowel or bladder no suspicion for cauda equina.  No indication for imaging or other emergent workup review of the [place] pharmacy database shows a total of 200 tablets of 10 mg Percocets for the past few weeks since surgery and the patient has gone through a 20 day prescription in only the past 6 days.  The patient admits that he has been doubling the dose and taking them more frequently.  Explained to the patient that this is an  excessive amount of pain medication to ground through.  There will be concern for liver damage as well as concern for tolerance/addiction and he is at risk for withdrawal.  I discussed the case with his orthopedist, Dr. Schroeder who shares my concern.  Requests we give the patient prescription for only 5 tablets to last him overnight, tomorrow morning and the patient will see him in his clinic tomorrow morning to address the pain control further, possible referral to pain management.    Clinical Impression:     1. Midline low back pain without sciatica, unspecified chronicity                                 Marco A Bernardo II, MD  12/03/17 4013

## 2017-12-03 NOTE — ED NOTES
Appearance: Pt awake, alert & oriented to person, place & time. Pt in no acute distress at present time. Pt is clean and well groomed with clothes appropriately fastened   Skin: Skin warm, dry & intact. Color consistent with ethnicity. Mucous membranes moist. No breakdown or brusing noted. Existing scar noted to midline back from back surgery. No redness, drainage, or swelling to site.   Musculoskeletal: Patient moving all extremities well, no obvious swelling or deformities noted. Pt ambulating with walker but reporting intense pain with walking.  Subjective reports of decreased sensation in right foot. DP pulses present bilaterally.   Respiratory: Respirations spontaneous, even, and non-labored. Visible chest rise noted. Airway is open and patent. No accessory muscle use noted.   Neurologic: Sensation is intact. Speech is clear and appropriate. Eyes open spontaneously, behavior appropriate to situation, follows commands, facial expression symmetrical, bilateral hand grasp equal and even, purposeful motor response noted.  Cardiac: All peripheral pulses present. No Bilateral lower extremity edema. Cap refill is <3 seconds. Pt denies active chest pains, SOB, dizziness, blurred vision, weakness or fatigue at this time.   Abdomen: Abdomen soft, non-tender to palpation. Pt denies active abd pains, cramping or discomfort, No N/V/D at this time. Pt last BM this AM.   : Pt reports no dysuria or hematuria.

## 2017-12-03 NOTE — ED NOTES
Pt had back surgery in September for bulging discs, was prescribed percocet 10/325 PO for back pain. Pt reporting he ran out of medication last night, took ibuprofen 800 mg PO at around apprx. 11 am, no relief of back pain. Back pain begins at right buttock extends down posteriorly to right ankle. Pt rpeorting decreased sensation and tingling in R foot. Was seen in ED on x 1 week ago for pain and DVT rule out, which was negative. Pt AAOx4 and appropriate at this time. Respirations even and unlabored. No acute distress noted. Pt reporting he has been constipated, had hard BM this AM.

## 2018-01-03 DIAGNOSIS — I10 ESSENTIAL HYPERTENSION: Primary | ICD-10-CM

## 2018-01-03 RX ORDER — LOSARTAN POTASSIUM 100 MG/1
100 TABLET ORAL DAILY
Qty: 90 TABLET | Refills: 2 | Status: SHIPPED | OUTPATIENT
Start: 2018-01-03

## 2018-03-28 ENCOUNTER — HOSPITAL ENCOUNTER (EMERGENCY)
Facility: OTHER | Age: 58
Discharge: HOME | End: 2018-03-28
Attending: EMERGENCY MEDICINE
Payer: MEDICARE

## 2018-03-28 VITALS
WEIGHT: 253 LBS | BODY MASS INDEX: 42.15 KG/M2 | SYSTOLIC BLOOD PRESSURE: 134 MMHG | TEMPERATURE: 98 F | HEART RATE: 86 BPM | HEIGHT: 65 IN | RESPIRATION RATE: 16 BRPM | OXYGEN SATURATION: 94 % | DIASTOLIC BLOOD PRESSURE: 86 MMHG

## 2018-03-28 DIAGNOSIS — R51.9 ACUTE NONINTRACTABLE HEADACHE, UNSPECIFIED HEADACHE TYPE: ICD-10-CM

## 2018-03-28 DIAGNOSIS — I10 HYPERTENSION: ICD-10-CM

## 2018-03-28 DIAGNOSIS — I10 UNCONTROLLED HYPERTENSION: Primary | ICD-10-CM

## 2018-03-28 LAB
ALBUMIN SERPL BCP-MCNC: 3.7 G/DL
ALP SERPL-CCNC: 72 U/L
ALT SERPL W/O P-5'-P-CCNC: 37 U/L
ANION GAP SERPL CALC-SCNC: 9 MMOL/L
AST SERPL-CCNC: 28 U/L
BASOPHILS # BLD AUTO: 0 K/UL
BASOPHILS NFR BLD: 0 %
BILIRUB SERPL-MCNC: 0.4 MG/DL
BUN SERPL-MCNC: <2 MG/DL
CALCIUM SERPL-MCNC: 9.7 MG/DL
CHLORIDE SERPL-SCNC: 100 MMOL/L
CO2 SERPL-SCNC: 25 MMOL/L
CREAT SERPL-MCNC: 0.7 MG/DL
DIFFERENTIAL METHOD: ABNORMAL
EOSINOPHIL # BLD AUTO: 0 K/UL
EOSINOPHIL NFR BLD: 0.6 %
ERYTHROCYTE [DISTWIDTH] IN BLOOD BY AUTOMATED COUNT: 14.9 %
EST. GFR  (AFRICAN AMERICAN): >60 ML/MIN/1.73 M^2
EST. GFR  (NON AFRICAN AMERICAN): >60 ML/MIN/1.73 M^2
GLUCOSE SERPL-MCNC: 98 MG/DL
HCT VFR BLD AUTO: 39.2 %
HGB BLD-MCNC: 13.5 G/DL
LYMPHOCYTES # BLD AUTO: 0.7 K/UL
LYMPHOCYTES NFR BLD: 14.4 %
MCH RBC QN AUTO: 31.5 PG
MCHC RBC AUTO-ENTMCNC: 34.4 G/DL
MCV RBC AUTO: 92 FL
MONOCYTES # BLD AUTO: 0.2 K/UL
MONOCYTES NFR BLD: 4.1 %
NEUTROPHILS # BLD AUTO: 3.8 K/UL
NEUTROPHILS NFR BLD: 80.9 %
PLATELET # BLD AUTO: 289 K/UL
PMV BLD AUTO: 8.1 FL
POTASSIUM SERPL-SCNC: 3.7 MMOL/L
PROT SERPL-MCNC: 6.4 G/DL
RBC # BLD AUTO: 4.28 M/UL
SODIUM SERPL-SCNC: 134 MMOL/L
WBC # BLD AUTO: 4.65 K/UL

## 2018-03-28 PROCEDURE — 85025 COMPLETE CBC W/AUTO DIFF WBC: CPT

## 2018-03-28 PROCEDURE — 80053 COMPREHEN METABOLIC PANEL: CPT

## 2018-03-28 PROCEDURE — 99284 EMERGENCY DEPT VISIT MOD MDM: CPT | Mod: 25

## 2018-03-28 PROCEDURE — 96374 THER/PROPH/DIAG INJ IV PUSH: CPT

## 2018-03-28 PROCEDURE — 93005 ELECTROCARDIOGRAM TRACING: CPT

## 2018-03-28 PROCEDURE — 93010 ELECTROCARDIOGRAM REPORT: CPT | Mod: ,,, | Performed by: INTERNAL MEDICINE

## 2018-03-28 PROCEDURE — 63600175 PHARM REV CODE 636 W HCPCS: Performed by: EMERGENCY MEDICINE

## 2018-03-28 RX ORDER — PROCHLORPERAZINE EDISYLATE 5 MG/ML
10 INJECTION INTRAMUSCULAR; INTRAVENOUS ONCE
Status: COMPLETED | OUTPATIENT
Start: 2018-03-28 | End: 2018-03-28

## 2018-03-28 RX ORDER — LABETALOL HYDROCHLORIDE 5 MG/ML
10 INJECTION, SOLUTION INTRAVENOUS
Status: DISCONTINUED | OUTPATIENT
Start: 2018-03-28 | End: 2018-03-28

## 2018-03-28 RX ADMIN — PROCHLORPERAZINE EDISYLATE 10 MG: 5 INJECTION INTRAMUSCULAR; INTRAVENOUS at 08:03

## 2018-03-28 NOTE — ED PROVIDER NOTES
"Encounter Date: 3/28/2018    SCRIBE #1 NOTE: I, Kathleen Israel, am scribing for, and in the presence of, Dr. Almodovar.       History     Chief Complaint   Patient presents with    Hypertension     " I could feel my blood pressure rising last night. I took a Losartan around 3PM. I was prepping for a colonoscopy this morning at 830". + generalzied HA, intermittent chills with nausea. denies chest pains, SOB, numbness/weakness to extremities     Time seen by provider: 7:21 AM    This is a 57 y.o. Male, with history of CAD, HLD, who presents with complaint of severe, generalized HA that has been constant for approximately nine hours. The patient describes the HA as a sharp and stabbing and reports that he has never experienced a HA like this before. The patient reports associated tingling to the right foot. He denies visual changes, speech difficulty, one-sided weakness, blood in stool, shakiness, hallucinations, fevers, chills, cough, nausea, vomiting, abdominal pain, SOB, chest pain, or changes in urination. The patient reports that his blood pressure has been high since last night. The last time he took his blood pressure, it was 105/101. His wife reports that the patient took Losartan approximately four hours ago, but his blood pressure was unchanged. The patient has a scheduled colonoscopy this morning at 8:30 AM and has not eaten for two days.      The history is provided by the patient and the spouse.     Review of patient's allergies indicates:  No Known Allergies  Past Medical History:   Diagnosis Date    Alcoholism     Coronary artery disease     Hypercholesteremia     Hypertension     Sleep apnea     Does not use CPAP     Past Surgical History:   Procedure Laterality Date    BACK SURGERY      CHOLECYSTECTOMY      2005    CORONARY STENT PLACEMENT  2014    1 stent placed    GASTRIC BYPASS  2003     History reviewed. No pertinent family history.  Social History   Substance Use Topics    Smoking " status: Current Every Day Smoker     Packs/day: 1.00     Years: 6.00     Types: Cigarettes     Start date: 7/20/2013    Smokeless tobacco: Never Used      Comment: 1 cigarette per day as of 12/3/17    Alcohol use No      Comment: quit drinking 03/18/18     Review of Systems   Constitutional: Negative for activity change, appetite change, chills, diaphoresis and fever.   HENT: Negative for congestion, sore throat and trouble swallowing.    Eyes: Negative for photophobia and visual disturbance.   Respiratory: Negative for cough, chest tightness and shortness of breath.    Cardiovascular: Negative for chest pain.   Gastrointestinal: Negative for abdominal pain, blood in stool, nausea and vomiting.   Endocrine: Negative for polydipsia, polyphagia and polyuria.   Genitourinary: Negative for difficulty urinating, dysuria, flank pain and frequency.   Musculoskeletal: Negative for back pain and neck pain.   Skin: Negative for pallor.   Neurological: Positive for headaches. Negative for speech difficulty, weakness and numbness.        Positive for tingling to the right foot. Negative for shakiness.   Psychiatric/Behavioral: Negative for confusion and hallucinations.       Physical Exam     Initial Vitals [03/28/18 0702]   BP Pulse Resp Temp SpO2   (!) 188/109 72 18 97.7 °F (36.5 °C) 99 %      MAP       135.33         Physical Exam    Nursing note and vitals reviewed.  Constitutional: He appears well-developed and well-nourished. No distress.   HENT:   Head: Normocephalic and atraumatic.   Eyes: EOM are normal. Pupils are equal, round, and reactive to light.   Neck: Normal range of motion. Neck supple.   Cardiovascular: Normal rate and normal heart sounds.   Pulmonary/Chest: Effort normal and breath sounds normal.   Abdominal: Soft. Normal appearance and bowel sounds are normal. There is no tenderness.   Musculoskeletal: Normal range of motion.   Neurological: He is alert and oriented to person, place, and time. He has  normal strength. No cranial nerve deficit or sensory deficit.   Cranial nerves intact. 5/5 strength to the bilateral upper and lower extremities. No pronator drift. Sensations intact.   Skin: Skin is warm and dry.   Psychiatric: He has a normal mood and affect. His behavior is normal. Thought content normal.         ED Course   Procedures  Labs Reviewed   COMPREHENSIVE METABOLIC PANEL - Abnormal; Notable for the following:        Result Value    Sodium 134 (*)     BUN, Bld <2 (*)     All other components within normal limits   CBC W/ AUTO DIFFERENTIAL - Abnormal; Notable for the following:     RBC 4.28 (*)     Hemoglobin 13.5 (*)     Hematocrit 39.2 (*)     MCH 31.5 (*)     RDW 14.9 (*)     MPV 8.1 (*)     Lymph # 0.7 (*)     Mono # 0.2 (*)     Gran% 80.9 (*)     Lymph% 14.4 (*)     All other components within normal limits     Imaging Results          CT Head Without Contrast (Final result)  Result time 03/28/18 08:16:05    Final result by Virgilio Kramer MD (03/28/18 08:16:05)                 Impression:      No acute intracranial abnormalities are identified.  There is no evidence for intracranial hemorrhage.    Atrophy which is greater than expected for the patient's chronological age, similar to the prior examination dated 04/03/2015.    Focal area of decreased density within the caudal aspect of the left basal ganglia likely representing either a small remote lacunar infarct or enlarged perivascular spaces.    Mild mucosal thickening within the right maxillary and ethmoid sinuses.      Electronically signed by: Virgilio Kramer MD  Date:    03/28/2018  Time:    08:16             Narrative:    EXAMINATION:  CT HEAD WITHOUT CONTRAST    CLINICAL HISTORY:  headache;    TECHNIQUE:  Low dose axial images were obtained through the head.  Coronal and sagittal reformations were also performed. Contrast was not administered.    COMPARISON:  Comparison is made to prior examination dated 04/03/2015.    FINDINGS:  The  ventricles are enlarged and the sulci are prominent consistent with generalized atrophy which is greater than expected for the patient's chronological age.  There is decreased density within the periventricular white matter likely reflecting ischemia/infarction secondary to microvascular disease.  There is no evidence for abnormal masses, mass effect, or midline shift.  No abnormal intra or extra-axial fluid collections are identified, specifically there is no evidence for intracranial hemorrhage.  There is a small focal hypodensity within the caudal aspect of the left basal ganglia which may represent a small remote lacunar infarct versus enlarged perivascular spaces.  There is mucosal thickening within the right ethmoid and maxillary sinuses.  Mastoid air cells are clear.  Bony calvarium is intact.                                   X-Rays:   Independently Interpreted Readings:   Head CT: No hemorrhage. (+) microvascular disease (+) volume loss     Medical Decision Making:   History:   Old Medical Records: I decided to obtain old medical records.  Old Records Summarized: records from previous admission(s).  Initial Assessment:   Emergency evaluation 57-year-old male presenting with acute onset headache, elevated blood pressure.  Differential Diagnosis:   Intracranial hemorrhage, hypertensive urgency, hypertensive emergency  Clinical Tests:   Lab Tests: Ordered and Reviewed  Radiological Study: Ordered and Reviewed  ED Management:  Patient seen and examined.  He is neurologically intact with no evidence of cranial nerve deficit.  Sensation is intact.  She appears uncomfortable secondary to headache.  Labs, CT head ordered.    Patient treated with Compazine IV.  Labetalol was initially ordered for acute management of hypertensive crisis but repeat blood pressure improved to 138/81.  Labetalol discontinued.    CT head shows no evidence of acute hemorrhage, labs unremarkable.  Patient remains neurologically intact with  improvement of BP.  Patient stable for discharge.  Recommend follow-up with neurology for further evaluation.              Scribe Attestation:   Scribe #1: I performed the above scribed service and the documentation accurately describes the services I performed. I attest to the accuracy of the note.    Attending Attestation:           Physician Attestation for Scribe:  Physician Attestation Statement for Scribe #1: I, Dr. Almodovar, reviewed documentation, as scribed by Kathleen Israel in my presence, and it is both accurate and complete.     Comments: I, Dr. Piper Almodovar, personally performed the services described in this documentation. All medical record entries made by the scribe were at my direction and in my presence.  I have reviewed the chart and agree that the record reflects my personal performance and is accurate and complete. Piper Almodovar MD.                 Clinical Impression:     1. Uncontrolled hypertension    2. Hypertension    3. Acute nonintractable headache, unspecified headache type          Disposition:   Disposition: Discharged  Condition: Stable                        Piper Almodovar MD  03/28/18 9304

## 2018-03-28 NOTE — ED NOTES
"Pt reports HA pain "all over, I feel like my head's gonna explode, massive pain all over my head, sharp, stabbing." Reports HTN. Denies chest pain, SOB. Reports right foot tingling. Took Losartan at 0330 this Am and "didn't do anything." Pt has a colonoscopy scheduled this AM at 0815. Denies bloody stools. Pt in detox, 10 days sober. Did not take seizure medication last night to prevent withdrawals. Unknown medication. NAD. Will cont to monitor.   "

## 2018-04-15 ENCOUNTER — HOSPITAL ENCOUNTER (EMERGENCY)
Facility: OTHER | Age: 58
Discharge: HOME | End: 2018-04-15
Attending: EMERGENCY MEDICINE
Payer: MEDICARE

## 2018-04-15 VITALS
SYSTOLIC BLOOD PRESSURE: 147 MMHG | DIASTOLIC BLOOD PRESSURE: 92 MMHG | HEIGHT: 66 IN | OXYGEN SATURATION: 97 % | HEART RATE: 66 BPM | BODY MASS INDEX: 42.27 KG/M2 | WEIGHT: 263 LBS | TEMPERATURE: 98 F | RESPIRATION RATE: 17 BRPM

## 2018-04-15 DIAGNOSIS — S62.114A CLOSED NONDISPLACED FRACTURE OF TRIQUETRUM OF RIGHT WRIST, INITIAL ENCOUNTER: Primary | ICD-10-CM

## 2018-04-15 DIAGNOSIS — S22.31XA CLOSED FRACTURE OF ONE RIB OF RIGHT SIDE, INITIAL ENCOUNTER: ICD-10-CM
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
## 2018-04-15 PROCEDURE — 94799 UNLISTED PULMONARY SVC/PX: CPT

## 2018-04-15 PROCEDURE — 29125 APPL SHORT ARM SPLINT STATIC: CPT | Mod: RT

## 2018-04-15 PROCEDURE — 99283 EMERGENCY DEPT VISIT LOW MDM: CPT | Mod: 25

## 2018-04-15 PROCEDURE — 25000003 PHARM REV CODE 250: Performed by: PHYSICIAN ASSISTANT

## 2018-04-15 RX ORDER — HYDROCODONE BITARTRATE AND ACETAMINOPHEN 5; 325 MG/1; MG/1
1 TABLET ORAL EVERY 6 HOURS PRN
Qty: 12 TABLET | Refills: 0 | Status: SHIPPED | OUTPATIENT
Start: 2018-04-15

## 2018-04-15 RX ORDER — HYDROCODONE BITARTRATE AND ACETAMINOPHEN 5; 325 MG/1; MG/1
1 TABLET ORAL
Status: COMPLETED | OUTPATIENT
Start: 2018-04-15 | End: 2018-04-15

## 2018-04-15 RX ADMIN — HYDROCODONE BITARTRATE AND ACETAMINOPHEN 1 TABLET: 5; 325 TABLET ORAL at 09:04

## 2018-04-16 NOTE — ED PROVIDER NOTES
Encounter Date: 4/15/2018       History     Chief Complaint   Patient presents with    Fall     Pt fell from standing today at 1400, CO right wrist and rib pain, denies LOC, head injury, and c-spine tenderness.      57-year-old male with history of CAD, hyperlipidemia, hypertension, sleep apnea, alcoholism with history of back surgery, gastric bypass and coronary stent placement presents to the emergency department with complaints of fall over a rubber speed bump around 1 PM.  He complains of pain to the right wrist and right-sided chest.  He denies injury or LOC.  He denies neck or back pain.  He denies any numbness or weakness, chest pain or shortness breath.  He states the pain is worse with palpation, movement.  He admits to taking 100 mg of ibuprofen around 2 PM.  He complains of pain that is an 8 out of 10.      The history is provided by the patient and the spouse.     Review of patient's allergies indicates:  No Known Allergies  Past Medical History:   Diagnosis Date    Alcoholism     Coronary artery disease     Hypercholesteremia     Hypertension     Sleep apnea     Does not use CPAP     Past Surgical History:   Procedure Laterality Date    BACK SURGERY      CHOLECYSTECTOMY      2005    CORONARY STENT PLACEMENT  2014    1 stent placed    GASTRIC BYPASS  2003     No family history on file.  Social History   Substance Use Topics    Smoking status: Current Every Day Smoker     Packs/day: 1.00     Years: 6.00     Types: Cigarettes     Start date: 7/20/2013    Smokeless tobacco: Never Used      Comment: 1 cigarette per day as of 12/3/17    Alcohol use No      Comment: quit drinking 03/18/18     Review of Systems   Constitutional: Negative for chills and fever.   HENT: Negative for sore throat.    Respiratory: Negative for chest tightness and shortness of breath.    Cardiovascular: Positive for chest pain (right chest wall).   Gastrointestinal: Negative for nausea and vomiting.   Genitourinary:  Negative for dysuria.   Musculoskeletal: Positive for arthralgias. Negative for back pain, gait problem, joint swelling, neck pain and neck stiffness.   Skin: Negative for rash.   Neurological: Negative for weakness and numbness.   Hematological: Does not bruise/bleed easily.       Physical Exam     Initial Vitals [04/15/18 1924]   BP Pulse Resp Temp SpO2   (!) 162/91 67 18 98.3 °F (36.8 °C) 99 %      MAP       114.67         Physical Exam    Nursing note and vitals reviewed.  Constitutional: He appears well-developed and well-nourished. He is not diaphoretic. He is Obese .  Non-toxic appearance. No distress.   HENT:   Head: Normocephalic and atraumatic.   Right Ear: External ear normal.   Left Ear: External ear normal.   Nose: Nose normal.   Eyes: Conjunctivae, EOM and lids are normal. Pupils are equal, round, and reactive to light. No scleral icterus.   Neck: Normal range of motion and phonation normal. Neck supple. No spinous process tenderness and no muscular tenderness present. Normal range of motion present. No neck rigidity.   Cardiovascular: Normal rate, regular rhythm, normal heart sounds, intact distal pulses and normal pulses. Exam reveals no gallop, no friction rub and no decreased pulses.    No murmur heard.  Pulmonary/Chest: Effort normal and breath sounds normal. No respiratory distress. He has no decreased breath sounds. He has no wheezes. He has no rhonchi. He has no rales. He exhibits tenderness. He exhibits no crepitus, no edema, no deformity and no swelling.       Abdominal: Normal appearance.   Musculoskeletal: Normal range of motion.        Right wrist: He exhibits tenderness. He exhibits normal range of motion, no swelling, no effusion, no crepitus, no deformity and no laceration.        Right hand: He exhibits normal range of motion, no tenderness, no bony tenderness, normal two-point discrimination, normal capillary refill, no deformity, no laceration and no swelling. Normal sensation noted.  Normal strength noted.        Hands:  No obvious deformities, moving all extremities, normal gait   Neurological: He is alert and oriented to person, place, and time. He has normal strength. He displays no atrophy. No sensory deficit. He exhibits normal muscle tone. Coordination and gait normal. GCS eye subscore is 4. GCS verbal subscore is 5. GCS motor subscore is 6.   Skin: Skin is warm, dry and intact. Capillary refill takes less than 2 seconds. No abrasion, no bruising, no ecchymosis, no laceration, no lesion and no rash noted. No erythema.   Psychiatric: He has a normal mood and affect. His speech is normal and behavior is normal. Judgment normal. Cognition and memory are normal.         ED Course   Orthopedic Injury  Date/Time: 4/15/2018 9:32 PM  Performed by: RILEY STEVENSON  Authorized by: CELESTINA TAVERAS     Consent Done?:  Not Needed  Injury:     Injury location:  Wrist    Location details:  Right wrist    Injury type:  Fracture    Fracture type: triquetral        Pre-procedure assessment:     Neurovascular status: Neurovascularly intact      Distal perfusion: normal      Neurological function: normal      Range of motion: normal      Local anesthesia used?: No      Patient sedated?: No        Selections made in this section will also lock the Injury type section above.:     Manipulation performed?: No      Immobilization:  Splint    Complications: No      Specimens: No      Implants: No    Post-procedure assessment:     Neurovascular status: Neurovascularly intact      Patient tolerance:  Patient tolerated the procedure well with no immediate complications      Labs Reviewed - No data to display     Imaging Results          X-Ray Wrist Complete Right (Final result)  Result time 04/15/18 20:35:16    Final result by Crow Johnson MD (04/15/18 20:35:16)                 Impression:      Acute triquetrum fracture.      Electronically signed by: Crow Johnson MD  Date:    04/15/2018  Time:    20:35              Narrative:    EXAMINATION:  XR WRIST COMPLETE 3 VIEWS RIGHT    CLINICAL HISTORY:  Unspecified fall, initial encounter    TECHNIQUE:  PA, lateral, and oblique views of the right wrist were performed.    COMPARISON:  None    FINDINGS:  Overall alignment is within normal limits.  Small osseous fragment projected dorsal to the carpals on the lateral view consistent with an acute triquetrum fracture.  No dislocation.  There is associated overlying soft tissue swelling.  No dislocation or destructive osseous process.  Remaining osseous structures are intact.  No subcutaneous emphysema or radiodense retained foreign body.                               X-Ray Ribs 2 View Right (Final result)  Result time 04/15/18 20:41:54    Final result by Vinayak Lawrence MD (04/15/18 20:41:54)                 Impression:      Possible nondisplaced fracture involving the right lateral 9th rib.  Correlate with point tenderness in this region.      Electronically signed by: Vinayak Lawrence MD  Date:    04/15/2018  Time:    20:41             Narrative:    EXAMINATION:  XR RIBS 2 VIEW RIGHT    CLINICAL HISTORY:  Unspecified fall, initial encounter    TECHNIQUE:  Two views of the right ribs were performed.    COMPARISON:  March 2017.    FINDINGS:  Questionable nondisplaced fracture seen involving the lateral aspect of the right 9th rib on lower rib oblique projection.  This is not definitely confirmed on other projections.  No additional acute displaced fracture seen.  Right lung is clear.                                X-Rays:   Independently Interpreted Readings:   Other Readings:  Right wrist- fracture of the triquetrum with bone fragments seen on lateral view  Ribs- possible 9th rib fracture. nondisplaced    Medical Decision Making:   History:   I obtained history from: someone other than patient.       <> Summary of History: spouse  Old Medical Records: I decided to obtain old medical records.  Initial Assessment:   57-year-old male  with complaints consistent with right wrist fracture ninth rib fracture status post fall.  Vital signs stable, afebrile, neurovascular intact.  He is alert, healthy and nontoxic appearing.  He is in no apparent distress.  No focal neurological deficits.  Exam documented above.  ED Management:  X-rays obtained independently interpreted by myself.  Concerns for nondisplaced ninth rib fracture as well as possible triquetrum fracture with bone fragments seen on lateral view.  He was administered Norco in the emergency department.  A wrist splint was applied to the right wrist.  He was given senna spirometer.  Given instructions for using Summers prominent.  He is stable will be discharged home with a prescription for Norco and care instructions.  He is to follow-up with his doctor in the next 48 hours or return for any worsening signs or symptoms.  He states understanding and agrees with this plan.  This patient was discussed with the attending physician who agrees with treatment plan.  Other:   I have discussed this case with another health care provider.       <> Summary of the Discussion: Avis  This note was created using Dragon Medical dictation.  There may be typographical errors secondary to dictation.                        Clinical Impression:     1. Closed nondisplaced fracture of triquetrum of right wrist, initial encounter    2. Fall    3. Closed fracture of one rib of right side, initial encounter        Disposition:   Disposition: Discharged  Condition: Stable                        Emi Wolff PA-C  04/15/18 2703

## 2018-08-13 DIAGNOSIS — I10 ESSENTIAL HYPERTENSION: Primary | ICD-10-CM

## 2018-08-13 RX ORDER — DILTIAZEM HYDROCHLORIDE 180 MG/1
180 CAPSULE, COATED, EXTENDED RELEASE ORAL DAILY
Qty: 90 CAPSULE | Refills: 0 | Status: SHIPPED | OUTPATIENT
Start: 2018-08-13

## 2018-11-15 RX ORDER — BUSPIRONE HYDROCHLORIDE 10 MG/1
TABLET ORAL
Qty: 90 TABLET | Refills: 0 | OUTPATIENT
Start: 2018-11-15

## 2018-11-17 RX ORDER — BUSPIRONE HYDROCHLORIDE 10 MG/1
TABLET ORAL
Qty: 90 TABLET | Refills: 0 | OUTPATIENT
Start: 2018-11-17

## 2021-04-07 ENCOUNTER — CLINICAL SUPPORT (OUTPATIENT)
Dept: SMOKING CESSATION | Facility: CLINIC | Age: 61
End: 2021-04-07
Payer: COMMERCIAL

## 2021-04-07 DIAGNOSIS — F17.210 HEAVY CIGARETTE SMOKER (20-39 PER DAY): Primary | ICD-10-CM

## 2021-04-07 PROCEDURE — 99403 PREV MED CNSL INDIV APPRX 45: CPT | Mod: S$GLB,,,

## 2021-04-07 PROCEDURE — 99403 PR PREVENT COUNSEL,INDIV,45 MIN: ICD-10-PCS | Mod: S$GLB,,,

## 2021-04-07 PROCEDURE — 99999 PR PBB SHADOW E&M-EST. PATIENT-LVL I: ICD-10-PCS | Mod: PBBFAC,,,

## 2021-04-07 PROCEDURE — 99999 PR PBB SHADOW E&M-EST. PATIENT-LVL I: CPT | Mod: PBBFAC,,,

## 2021-04-07 RX ORDER — IBUPROFEN 200 MG
1 TABLET ORAL DAILY
Qty: 28 PATCH | Refills: 0 | Status: SHIPPED | OUTPATIENT
Start: 2021-04-07 | End: 2021-04-07 | Stop reason: SDUPTHER

## 2021-04-07 RX ORDER — IBUPROFEN 200 MG
1 TABLET ORAL DAILY
Qty: 28 PATCH | Refills: 0 | Status: SHIPPED | OUTPATIENT
Start: 2021-04-07

## 2021-04-16 ENCOUNTER — PATIENT MESSAGE (OUTPATIENT)
Dept: RESEARCH | Facility: HOSPITAL | Age: 61
End: 2021-04-16

## 2021-04-28 ENCOUNTER — CLINICAL SUPPORT (OUTPATIENT)
Dept: SMOKING CESSATION | Facility: CLINIC | Age: 61
End: 2021-04-28
Payer: COMMERCIAL

## 2021-04-28 DIAGNOSIS — F17.210 MODERATE CIGARETTE SMOKER (10-19 PER DAY): Primary | ICD-10-CM

## 2021-04-28 PROCEDURE — 90853 GROUP PSYCHOTHERAPY: CPT | Mod: S$GLB,,,

## 2021-04-28 PROCEDURE — 99999 PR PBB SHADOW E&M-EST. PATIENT-LVL I: ICD-10-PCS | Mod: PBBFAC,,,

## 2021-04-28 PROCEDURE — 90853 PR GROUP PSYCHOTHERAPY: ICD-10-PCS | Mod: S$GLB,,,

## 2021-04-28 PROCEDURE — 99999 PR PBB SHADOW E&M-EST. PATIENT-LVL I: CPT | Mod: PBBFAC,,,

## 2021-05-12 ENCOUNTER — TELEPHONE (OUTPATIENT)
Dept: SMOKING CESSATION | Facility: CLINIC | Age: 61
End: 2021-05-12

## 2021-05-27 ENCOUNTER — TELEPHONE (OUTPATIENT)
Dept: SMOKING CESSATION | Facility: CLINIC | Age: 61
End: 2021-05-27

## 2021-11-16 ENCOUNTER — TELEPHONE (OUTPATIENT)
Dept: SMOKING CESSATION | Facility: CLINIC | Age: 61
End: 2021-11-16
Payer: MEDICARE

## 2021-12-09 ENCOUNTER — TELEPHONE (OUTPATIENT)
Dept: SMOKING CESSATION | Facility: CLINIC | Age: 61
End: 2021-12-09
Payer: MEDICARE

## 2021-12-22 ENCOUNTER — TELEPHONE (OUTPATIENT)
Dept: SMOKING CESSATION | Facility: CLINIC | Age: 61
End: 2021-12-22
Payer: MEDICARE

## 2022-04-18 ENCOUNTER — CLINICAL SUPPORT (OUTPATIENT)
Dept: SMOKING CESSATION | Facility: CLINIC | Age: 62
End: 2022-04-18
Payer: COMMERCIAL

## 2022-04-18 DIAGNOSIS — F17.200 NICOTINE DEPENDENCE: Primary | ICD-10-CM

## 2022-04-18 NOTE — PROGRESS NOTES
Spoke with patient's wife today in regard to smoking cessation progress for 12 month telephone follow up, she states patient is not tobacco free. Wife states she will discuss the program with her  and will consider contacting us at a later time to schedule an appointment. Informed patient of benefit period, future follow up, and contact information if any further help or support is needed. Will complete smart form for 3 and 12 month follow up and resolve Quit attempt #1.

## 2022-06-13 ENCOUNTER — HOSPITAL ENCOUNTER (EMERGENCY)
Facility: OTHER | Age: 62
Discharge: HOME | End: 2022-06-13
Attending: EMERGENCY MEDICINE
Payer: MEDICARE

## 2022-06-13 VITALS
BODY MASS INDEX: 38.14 KG/M2 | SYSTOLIC BLOOD PRESSURE: 120 MMHG | RESPIRATION RATE: 17 BRPM | HEART RATE: 51 BPM | WEIGHT: 243 LBS | OXYGEN SATURATION: 97 % | DIASTOLIC BLOOD PRESSURE: 83 MMHG | HEIGHT: 67 IN | TEMPERATURE: 98 F

## 2022-06-13 DIAGNOSIS — S39.011A STRAIN OF FLANK, INITIAL ENCOUNTER: Primary | ICD-10-CM

## 2022-06-13 PROCEDURE — 25000003 PHARM REV CODE 250: Performed by: EMERGENCY MEDICINE

## 2022-06-13 PROCEDURE — 63600175 PHARM REV CODE 636 W HCPCS: Performed by: EMERGENCY MEDICINE

## 2022-06-13 PROCEDURE — 99284 EMERGENCY DEPT VISIT MOD MDM: CPT | Mod: 25

## 2022-06-13 PROCEDURE — 96372 THER/PROPH/DIAG INJ SC/IM: CPT | Performed by: EMERGENCY MEDICINE

## 2022-06-13 RX ORDER — LIDOCAINE 50 MG/G
1 PATCH TOPICAL DAILY
Qty: 15 PATCH | Refills: 0 | Status: SHIPPED | OUTPATIENT
Start: 2022-06-13

## 2022-06-13 RX ORDER — ORPHENADRINE CITRATE 30 MG/ML
60 INJECTION INTRAMUSCULAR; INTRAVENOUS
Status: COMPLETED | OUTPATIENT
Start: 2022-06-13 | End: 2022-06-13

## 2022-06-13 RX ORDER — KETOROLAC TROMETHAMINE 30 MG/ML
10 INJECTION, SOLUTION INTRAMUSCULAR; INTRAVENOUS
Status: COMPLETED | OUTPATIENT
Start: 2022-06-13 | End: 2022-06-13

## 2022-06-13 RX ORDER — CYCLOBENZAPRINE HCL 10 MG
10 TABLET ORAL 3 TIMES DAILY PRN
Qty: 15 TABLET | Refills: 0 | Status: SHIPPED | OUTPATIENT
Start: 2022-06-13 | End: 2022-06-18

## 2022-06-13 RX ORDER — MELOXICAM 15 MG/1
15 TABLET ORAL DAILY
Qty: 15 TABLET | Refills: 0 | Status: SHIPPED | OUTPATIENT
Start: 2022-06-13

## 2022-06-13 RX ORDER — LIDOCAINE 50 MG/G
1 PATCH TOPICAL
Status: DISCONTINUED | OUTPATIENT
Start: 2022-06-13 | End: 2022-06-13 | Stop reason: HOSPADM

## 2022-06-13 RX ADMIN — ORPHENADRINE CITRATE 60 MG: 30 INJECTION INTRAMUSCULAR; INTRAVENOUS at 06:06

## 2022-06-13 RX ADMIN — LIDOCAINE 1 PATCH: 50 PATCH TOPICAL at 07:06

## 2022-06-13 RX ADMIN — KETOROLAC TROMETHAMINE 10 MG: 30 INJECTION, SOLUTION INTRAMUSCULAR at 06:06

## 2022-06-13 NOTE — ED NOTES
Pt awake and alert; resting quietly on stretcher. Pt remains on continuous pulse ox monitoring with non-invasive blood pressure to cycle every 30 minutes. VS stable. No acute distress or discomfort reported or observed. Pt denies restroom needs at this time; is able to reposition self on stretcher. Bed locked in lowest position; side rails up and locked x 2; call light, bedside table, and personal belongings within reach. Room assessed for safety measures and cleanliness; no action needed at this time. Plan of care discussed. Pt instructed to alert nurse for assistance and before attempting to get out of bed; verbalizes understanding. Pt denies needs or complaints at this time; will continue to monitor.

## 2022-06-13 NOTE — ED PROVIDER NOTES
"SCRIBE #1 NOTE: ICristina, am scribing for, and in the presence of, Josette Rich MD.         Source of History:  The patient.    Chief complaint:  Rib Injury (Pt complains of left rib pain. Onset Saturday night. Pt states he was working on a car that day and his body was in an awkward position. Rib pain radiates to back. +SOB. Denies CP. Pt denies injury. NAD. Pt ambulates with can daily. )      HPI:  Leland Jasso is a 61 y.o. male presenting with left rib pain radiating to his back that began 2 days ago. The patient reports that the pain began when he was leaning over in an awkward position while working on a car. He denies injury. He states that he had a coughing spell this morning, which caused the pain to be "stabbing." The patient reports shortness of breath but denies associated chest pain. He notes that he took tylenol without relief. The patient has a PSHx of back surgery.    This is the extent to the patients complaints today here in the emergency department.    ROS: As per HPI and below:  General: No fever.  No chills.  Eyes: No visual changes.  ENT: No sore throat. No ear pain  Head: No headache.    Respiratory: Notes shortness of breath.  Cardiovascular: No chest pain.  Abdomen: No abdominal pain.  No nausea or vomiting.  Genito-Urinary: No abnormal urination.  Neurologic: No focal weakness.  No numbness.  MSK: Notes left rib pain radiating to back.  Integument: No rashes or lesions.  Hematologic: No easy bruising.  Endocrine: No excessive thirst or urination.      Review of patient's allergies indicates:  No Known Allergies    PMH:  As per HPI and below:  Past Medical History:   Diagnosis Date    Alcoholism     Coronary artery disease     Hypercholesteremia     Hypertension     Sleep apnea     Does not use CPAP     Past Surgical History:   Procedure Laterality Date    BACK SURGERY      CHOLECYSTECTOMY      2005    CORONARY STENT PLACEMENT  2014 1 stent placed    GASTRIC BYPASS  " "2003       Social History     Tobacco Use    Smoking status: Current Every Day Smoker     Packs/day: 1.00     Years: 6.00     Pack years: 6.00     Types: Cigarettes     Start date: 7/20/2013    Smokeless tobacco: Never Used    Tobacco comment: 1 cigarette per day as of 12/3/17   Substance Use Topics    Alcohol use: No     Comment: quit drinking 03/18/18    Drug use: No       Physical Exam:    /83 (BP Location: Left arm, Patient Position: Sitting)   Pulse (!) 51   Temp 97.7 °F (36.5 °C)   Resp 17   Ht 5' 7" (1.702 m)   Wt 110.2 kg (243 lb)   SpO2 97%   BMI 38.06 kg/m²   General: No fever.  No chills.  Eyes: No visual changes.  ENT: No sore throat. No ear pain  Head: No headache.    Respiratory: No shortness of breath.  Cardiovascular: No chest pain.  Abdomen: No abdominal pain.  No nausea or vomiting.  Genito-Urinary: No abnormal urination.  Neurologic: No focal weakness.  No numbness.  MSK: No back pain. Tenderness along left lower lateral ribs without crepitus or deformity. No rash.  Integument: No rashes or lesions.  Hematologic: No easy bruising.  Endocrine: No excessive thirst or urination.      Initial Impression  61 y.o. male with left flank and lower chest discomfort following working in an awkward position on car. Plan muscle relaxer and reassess.    Differential Dx:  Differential diagnosis includes, but is not limited to:  Musculoskeletal causes, kidney stone, pyelonephritis, shingles, and intra-abdominal causes such as diverticulitis and appendicitis, aortic dissection, abdominal aortic aneurysm, colitis, PE, pneumonia.         MDM:      Patient improved with treatment in the emergency department and comfortable going home. Discussed reasons to return and importance of followup.  Patient understands that the emergency visit today is primarily to address immediate concerns and to rule out emergent cause of symptoms and that they may require further workup and evaluation as an outpatient. " All questions addressed and patient given discharge instructions and followup information.                   Scribe Attestation:   Scribe #1: I performed the above scribed service and the documentation accurately describes the services I performed. I attest to the accuracy of the note.    Physician Attestation for Scribe: I, Josette Rich MD, reviewed documentation as scribed in my presence, which is both accurate and complete.      Diagnostic Impression:    1. Strain of flank, initial encounter         ED Disposition Condition    Discharge Stable          ED Prescriptions     Medication Sig Dispense Start Date End Date Auth. Provider    meloxicam (MOBIC) 15 MG tablet Take 1 tablet (15 mg total) by mouth once daily. Take with food 15 tablet 6/13/2022  Josette Rich MD    cyclobenzaprine (FLEXERIL) 10 MG tablet Take 1 tablet (10 mg total) by mouth 3 (three) times daily as needed for Muscle spasms. 15 tablet 6/13/2022 6/18/2022 Josette Rich MD    LIDOcaine (LIDODERM) 5 % Place 1 patch onto the skin once daily. Remove & Discard patch within 12 hours or as directed by MD 15 patch 6/13/2022  Josette Rich MD        Follow-up Information     Follow up With Specialties Details Why Contact Info    Cuba Díaz III, MD Internal Medicine Schedule an appointment as soon as possible for a visit   [address]  [address] [id number] [phone]               Josette Rich MD  06/14/22 [id number]

## 2022-06-13 NOTE — ED TRIAGE NOTES
Pt present to ED complaining of left rib pain. Pain radiates to chest. Pt denies injury. NAD. AAOx4. +SOB, denies CP.